# Patient Record
Sex: FEMALE | HISPANIC OR LATINO
[De-identification: names, ages, dates, MRNs, and addresses within clinical notes are randomized per-mention and may not be internally consistent; named-entity substitution may affect disease eponyms.]

---

## 2017-01-04 ENCOUNTER — HOSPITAL (OUTPATIENT)
Dept: OTHER | Age: 72
End: 2017-01-04
Attending: INTERNAL MEDICINE

## 2017-01-04 LAB
A/G RATIO_: 0.7
ABS LYMPH: 1.1 K/CUMM (ref 1–3.5)
ABS MONO: 0.7 K/CUMM (ref 0.1–0.8)
ABS NEUTRO: 1.8 K/CUMM (ref 2–8)
ALBUMIN: 2.6 G/DL (ref 3.5–5)
ALK PHOS: 65 UNIT/L (ref 50–124)
ALT/GPT: 12 UNIT/L (ref 0–55)
ANION GAP SERPL CALC-SCNC: 14 MEQ/L (ref 10–20)
AST/GOT: 18 UNIT/L (ref 5–34)
BASOPHIL: 0 % (ref 0–1)
BILI TOTAL: 0.8 MG/DL (ref 0.2–1)
BNP: 452 PG/ML (ref 1–100)
BUN SERPL-MCNC: 18 MG/DL (ref 6–20)
CALCIUM: 8.5 MG/DL (ref 8.4–10.2)
CHLORIDE: 102 MEQ/L (ref 97–107)
CREATININE: 1.05 MG/DL (ref 0.6–1.3)
DIFF_TYPE?: ABNORMAL
EOSINOPHIL: 2 % (ref 0–6)
GLOBULIN_: 3.7 G/DL (ref 2–4.1)
GLUCOSE LVL: 244 MG/DL (ref 70–99)
HCT VFR BLD CALC: 28 % (ref 33–45)
HEMOLYSIS 2+: NEGATIVE
HEMOLYSIS 4+: NEGATIVE
HGB BLD-MCNC: 9.1 G/DL (ref 11–15)
IMMATURE GRAN: 1.8 % (ref 0–0.3)
INSTR WBC: 3.8 K/CUMM (ref 4–11)
LIPEMIC 3+: NEGATIVE
LYMPHOCYTE: 29 %
MCH RBC QN AUTO: 33 PG (ref 25–35)
MCHC RBC AUTO-ENTMCNC: 32 G/DL (ref 32–37)
MCV RBC AUTO: 102 FL (ref 78–97)
MONOCYTE: 19 %
NEUTROPHIL: 48 %
NRBC BLD MANUAL-RTO: 0.5 % (ref 0–0.2)
PLATELET: 259 K/CUMM (ref 150–450)
PLT ESTIMATE: ADEQUATE
POTASSIUM: 3.5 MEQ/L (ref 3.5–5.1)
RBC # BLD: 2.79 M/CUMM (ref 3.7–5.2)
RBC MORPH2: ABNORMAL
RBC MORPH3: ABNORMAL
RBC MORPH4: ABNORMAL
RBC MORPH: ABNORMAL
RDW: 20 % (ref 11.5–14.5)
SODIUM: 136 MEQ/L (ref 136–145)
TCO2: 24 MEQ/L (ref 19–29)
TOTAL PROTEIN: 6.3 G/DL (ref 6.4–8.3)
WBC # BLD: 3.8 K/CUMM (ref 4–11)

## 2017-01-06 ENCOUNTER — HOSPITAL (OUTPATIENT)
Dept: OTHER | Age: 72
End: 2017-01-06
Attending: FAMILY MEDICINE

## 2017-01-06 LAB
A/G RATIO_: 0.7
ABS LYMPH: 1.1 K/CUMM (ref 1–3.5)
ABS MONO: 0.8 K/CUMM (ref 0.1–0.8)
ABS NEUTRO: 4.8 K/CUMM (ref 2–8)
ALBUMIN: 3 G/DL (ref 3.5–5)
ALK PHOS: 73 UNIT/L (ref 50–124)
ALT/GPT: 11 UNIT/L (ref 0–55)
ANION GAP SERPL CALC-SCNC: 12 MEQ/L (ref 10–20)
APTT PPP: 24 SECONDS (ref 22–30)
AST/GOT: 23 UNIT/L (ref 5–34)
BASOPHIL: 1 % (ref 0–1)
BILI TOTAL: 1 MG/DL (ref 0.2–1)
BUN SERPL-MCNC: 19 MG/DL (ref 6–20)
CALCIUM: 9.1 MG/DL (ref 8.4–10.2)
CHLORIDE: 102 MEQ/L (ref 97–107)
CREATININE: 0.93 MG/DL (ref 0.6–1.3)
DEVICE SN: ABNORMAL
DIFF_TYPE?: ABNORMAL
EOSINOPHIL: 1 % (ref 0–6)
GLOBULIN_: 4.2 G/DL (ref 2–4.1)
GLUCOSE LVL: 280 MG/DL (ref 70–99)
HCT VFR BLD CALC: 32 % (ref 33–45)
HEMOLYSIS 2+: NEGATIVE
HEMOLYSIS 4+: NEGATIVE
HGB BLD-MCNC: 9.8 G/DL (ref 11–15)
ICTERIC 4+: NEGATIVE
IMMATURE GRAN: 2.7 % (ref 0–0.3)
INR: 1.1 (ref 0.9–1.1)
INSTR WBC: 7 K/CUMM (ref 4–11)
KETONES: NEGATIVE
LIPEMIC 3+: NEGATIVE
LYMPHOCYTE: 15 %
MCH RBC QN AUTO: 32 PG (ref 25–35)
MCHC RBC AUTO-ENTMCNC: 31 G/DL (ref 32–37)
MCV RBC AUTO: 102 FL (ref 78–97)
MONOCYTE: 12 %
NEUTROPHIL: 69 %
NRBC BLD MANUAL-RTO: 0 % (ref 0–0.2)
PLATELET: 275 K/CUMM (ref 150–450)
POC_GLU: 226 MG/DL (ref 70–99)
POTASSIUM: 3.7 MEQ/L (ref 3.5–5.1)
PROTHROMBIN TIME: 11.6 SECONDS (ref 9.7–11.6)
RBC # BLD: 3.09 M/CUMM (ref 3.7–5.2)
RDW: 19.9 % (ref 11.5–14.5)
SODIUM: 138 MEQ/L (ref 136–145)
TCO2: 28 MEQ/L (ref 19–29)
TECH_ID: ABNORMAL
TOTAL CK: 331 U/L (ref 29–168)
TOTAL PROTEIN: 7.2 G/DL (ref 6.4–8.3)
TROPONIN I: 0.05 NG/ML
UA APPEAR: CLEAR
UA BACTERIA: ABNORMAL
UA BILI: NEGATIVE
UA BLOOD: ABNORMAL
UA COLOR: YELLOW
UA CRYSTAL: ABNORMAL
UA EPITHELIAL: ABNORMAL
UA GLUCOSE: ABNORMAL
UA KETONES: NEGATIVE
UA LEUK EST: ABNORMAL
UA MUCOUS: ABNORMAL
UA NITRITE: NEGATIVE
UA PH: 5 (ref 5–7)
UA PROTEIN: NEGATIVE
UA RBC: ABNORMAL
UA SPEC GRAV: 1.02 (ref 1.01–1.02)
UA UROBILINOGEN: 0.2 MG/DL (ref 0.2–1)
UA WBC: ABNORMAL
WBC # BLD: 7 K/CUMM (ref 4–11)

## 2017-01-07 LAB
ABS LYMPH: 1.1 K/CUMM (ref 1–3.5)
ABS MONO: 0.9 K/CUMM (ref 0.1–0.8)
ABS NEUTRO: 3.3 K/CUMM (ref 2–8)
ANION GAP SERPL CALC-SCNC: 11 MEQ/L (ref 10–20)
BASOPHIL: 0 % (ref 0–1)
BUN SERPL-MCNC: 15 MG/DL (ref 6–20)
CALCIUM: 8.1 MG/DL (ref 8.4–10.2)
CHLORIDE: 106 MEQ/L (ref 97–107)
CREATININE: 0.79 MG/DL (ref 0.6–1.3)
DEVICE SN: ABNORMAL
DIFF_TYPE?: ABNORMAL
EOSINOPHIL: 2 % (ref 0–6)
GLUCOSE LVL: 195 MG/DL (ref 70–99)
HCT VFR BLD CALC: 27 % (ref 33–45)
HEMOLYSIS 2+: NEGATIVE
HGB BLD-MCNC: 8.4 G/DL (ref 11–15)
IMMATURE GRAN: 1.8 % (ref 0–0.3)
INSTR WBC: 5.5 K/CUMM (ref 4–11)
LYMPHOCYTE: 20 %
MCH RBC QN AUTO: 32 PG (ref 25–35)
MCHC RBC AUTO-ENTMCNC: 31 G/DL (ref 32–37)
MCV RBC AUTO: 104 FL (ref 78–97)
MONOCYTE: 16 %
NEUTROPHIL: 60 %
NRBC BLD MANUAL-RTO: 0 % (ref 0–0.2)
PLATELET: 226 K/CUMM (ref 150–450)
POC_GLU: 130 MG/DL (ref 70–99)
POC_GLU: 188 MG/DL (ref 70–99)
POC_GLU: 204 MG/DL (ref 70–99)
POC_GLU: 204 MG/DL (ref 70–99)
POTASSIUM: 3.6 MEQ/L (ref 3.5–5.1)
RBC # BLD: 2.6 M/CUMM (ref 3.7–5.2)
RDW: 19.9 % (ref 11.5–14.5)
SODIUM: 136 MEQ/L (ref 136–145)
TCO2: 23 MEQ/L (ref 19–29)
TECH_ID: ABNORMAL
WBC # BLD: 5.5 K/CUMM (ref 4–11)

## 2017-01-08 LAB
ABS LYMPH: 1.2 K/CUMM (ref 1–3.5)
ABS MONO: 1 K/CUMM (ref 0.1–0.8)
ABS NEUTRO: 4 K/CUMM (ref 2–8)
ANION GAP SERPL CALC-SCNC: 10 MEQ/L (ref 10–20)
BASOPHIL: 0 % (ref 0–1)
BUN SERPL-MCNC: 18 MG/DL (ref 6–20)
CALCIUM: 8.2 MG/DL (ref 8.4–10.2)
CHLORIDE: 106 MEQ/L (ref 97–107)
CREATININE: 0.75 MG/DL (ref 0.6–1.3)
DEVICE SN: ABNORMAL
DIFF_TYPE?: ABNORMAL
EOSINOPHIL: 2 % (ref 0–6)
GLUCOSE LVL: 146 MG/DL (ref 70–99)
HCT VFR BLD CALC: 28 % (ref 33–45)
HEMOLYSIS 2+: NEGATIVE
HEMOLYSIS 2+: NEGATIVE
HGB BLD-MCNC: 8.6 G/DL (ref 11–15)
IMMATURE GRAN: 1.5 % (ref 0–0.3)
INSTR WBC: 6.5 K/CUMM (ref 4–11)
LYMPHOCYTE: 18 %
MAGNESIUM LEVEL: 1.7 MG/DL (ref 1.6–2.6)
MCH RBC QN AUTO: 32 PG (ref 25–35)
MCHC RBC AUTO-ENTMCNC: 31 G/DL (ref 32–37)
MCV RBC AUTO: 105 FL (ref 78–97)
MONOCYTE: 16 %
NEUTROPHIL: 62 %
NRBC BLD MANUAL-RTO: 0 % (ref 0–0.2)
PLATELET: 221 K/CUMM (ref 150–450)
POC_GLU: 137 MG/DL (ref 70–99)
POC_GLU: 144 MG/DL (ref 70–99)
POC_GLU: 155 MG/DL (ref 70–99)
POC_GLU: 176 MG/DL (ref 70–99)
POTASSIUM: 4.2 MEQ/L (ref 3.5–5.1)
RBC # BLD: 2.67 M/CUMM (ref 3.7–5.2)
RDW: 19.5 % (ref 11.5–14.5)
SODIUM: 137 MEQ/L (ref 136–145)
TCO2: 25 MEQ/L (ref 19–29)
TECH_ID: ABNORMAL
WBC # BLD: 6.5 K/CUMM (ref 4–11)

## 2017-01-09 LAB
ABS LYMPH: 1.1 K/CUMM (ref 1–3.5)
ABS MONO: 0.9 K/CUMM (ref 0.1–0.8)
ABS NEUTRO: 3.8 K/CUMM (ref 2–8)
BASOPHIL: 0 % (ref 0–1)
DEVICE SN: ABNORMAL
DIFF_TYPE?: ABNORMAL
EOSINOPHIL: 3 % (ref 0–6)
HCT VFR BLD CALC: 28 % (ref 33–45)
HGB BLD-MCNC: 8.6 G/DL (ref 11–15)
IMMATURE GRAN: 2.3 % (ref 0–0.3)
INSTR WBC: 6.1 K/CUMM (ref 4–11)
LYMPHOCYTE: 17 %
MCH RBC QN AUTO: 32 PG (ref 25–35)
MCHC RBC AUTO-ENTMCNC: 31 G/DL (ref 32–37)
MCV RBC AUTO: 103 FL (ref 78–97)
MONOCYTE: 15 %
NEUTROPHIL: 62 %
NRBC BLD MANUAL-RTO: 0 % (ref 0–0.2)
PLATELET: 239 K/CUMM (ref 150–450)
POC_GLU: 137 MG/DL (ref 70–99)
POC_GLU: 162 MG/DL (ref 70–99)
POC_GLU: 183 MG/DL (ref 70–99)
POC_GLU: 227 MG/DL (ref 70–99)
RBC # BLD: 2.66 M/CUMM (ref 3.7–5.2)
RDW: 18.8 % (ref 11.5–14.5)
TECH_ID: ABNORMAL
WBC # BLD: 6.1 K/CUMM (ref 4–11)

## 2017-01-10 LAB
DEVICE SN: ABNORMAL
POC_GLU: 126 MG/DL (ref 70–99)
POC_GLU: 172 MG/DL (ref 70–99)
POC_GLU: 181 MG/DL (ref 70–99)
POC_GLU: 194 MG/DL (ref 70–99)
TECH_ID: ABNORMAL

## 2017-01-11 LAB
ABS LYMPH: 0.9 K/CUMM (ref 1–3.5)
ABS MONO: 0.8 K/CUMM (ref 0.1–0.8)
ABS NEUTRO: 4.2 K/CUMM (ref 2–8)
BASOPHIL: 1 % (ref 0–1)
DEVICE SN: ABNORMAL
DEVICE SN: ABNORMAL
DIFF_TYPE?: ABNORMAL
EOSINOPHIL: 2 % (ref 0–6)
HCT VFR BLD CALC: 29 % (ref 33–45)
HGB BLD-MCNC: 8.9 G/DL (ref 11–15)
IMMATURE GRAN: 1.7 % (ref 0–0.3)
INSTR WBC: 6.3 K/CUMM (ref 4–11)
LYMPHOCYTE: 15 %
MCH RBC QN AUTO: 32 PG (ref 25–35)
MCHC RBC AUTO-ENTMCNC: 31 G/DL (ref 32–37)
MCV RBC AUTO: 103 FL (ref 78–97)
MONOCYTE: 13 %
NEUTROPHIL: 67 %
NRBC BLD MANUAL-RTO: 0 % (ref 0–0.2)
PLATELET: 260 K/CUMM (ref 150–450)
POC_GLU: 143 MG/DL (ref 70–99)
POC_GLU: 187 MG/DL (ref 70–99)
RBC # BLD: 2.82 M/CUMM (ref 3.7–5.2)
RDW: 18 % (ref 11.5–14.5)
TECH_ID: ABNORMAL
TECH_ID: ABNORMAL
WBC # BLD: 6.3 K/CUMM (ref 4–11)

## 2017-01-12 ENCOUNTER — HOSPITAL (OUTPATIENT)
Dept: OTHER | Age: 72
End: 2017-01-12
Attending: INTERNAL MEDICINE

## 2017-01-12 ENCOUNTER — HOSPITAL (OUTPATIENT)
Dept: OTHER | Age: 72
End: 2017-01-12

## 2017-01-12 LAB
A/G RATIO_: 0.6
A/G RATIO_: 0.6
ABS LYMPH: 0.9 K/CUMM (ref 1–3.5)
ABS LYMPH: 1.3 K/CUMM (ref 1–3.5)
ABS MONO: 0.9 K/CUMM (ref 0.1–0.8)
ABS MONO: 1.1 K/CUMM (ref 0.1–0.8)
ABS NEUTRO: 4.1 K/CUMM (ref 2–8)
ABS NEUTRO: 4.2 K/CUMM (ref 2–8)
ALBUMIN: 2.3 G/DL (ref 3.5–5)
ALBUMIN: 2.4 G/DL (ref 3.5–5)
ALK PHOS: 67 UNIT/L (ref 50–124)
ALK PHOS: 68 UNIT/L (ref 50–124)
ALT/GPT: <6 UNIT/L (ref 0–55)
ALT/GPT: <6 UNIT/L (ref 0–55)
ANION GAP SERPL CALC-SCNC: 12 MEQ/L (ref 10–20)
ANION GAP SERPL CALC-SCNC: 14 MEQ/L (ref 10–20)
AST/GOT: 17 UNIT/L (ref 5–34)
AST/GOT: 18 UNIT/L (ref 5–34)
BASOPHIL: 0 % (ref 0–1)
BASOPHIL: 0 % (ref 0–1)
BILI TOTAL: 0.6 MG/DL (ref 0.2–1)
BILI TOTAL: 0.7 MG/DL (ref 0.2–1)
BUN SERPL-MCNC: 14 MG/DL (ref 6–20)
BUN SERPL-MCNC: 20 MG/DL (ref 6–20)
CALCIUM: 8.5 MG/DL (ref 8.4–10.2)
CALCIUM: 8.6 MG/DL (ref 8.4–10.2)
CHLORIDE: 100 MEQ/L (ref 97–107)
CHLORIDE: 102 MEQ/L (ref 97–107)
CREATININE: 0.71 MG/DL (ref 0.6–1.3)
CREATININE: 0.78 MG/DL (ref 0.6–1.3)
DIFF_TYPE?: ABNORMAL
DIFF_TYPE?: ABNORMAL
EOSINOPHIL: 2 % (ref 0–6)
EOSINOPHIL: 3 % (ref 0–6)
GLOBULIN_: 3.6 G/DL (ref 2–4.1)
GLOBULIN_: 3.7 G/DL (ref 2–4.1)
GLUCOSE LVL: 142 MG/DL (ref 70–99)
GLUCOSE LVL: 202 MG/DL (ref 70–99)
HCT VFR BLD CALC: 28 % (ref 33–45)
HCT VFR BLD CALC: 29 % (ref 33–45)
HEMOLYSIS 1+: NEGATIVE
HEMOLYSIS 2+: NEGATIVE
HGB BLD-MCNC: 8.7 G/DL (ref 11–15)
HGB BLD-MCNC: 9.1 G/DL (ref 11–15)
IMMATURE GRAN: 1.7 % (ref 0–0.3)
IMMATURE GRAN: 1.8 % (ref 0–0.3)
INSTR WBC: 6.2 K/CUMM (ref 4–11)
INSTR WBC: 6.9 K/CUMM (ref 4–11)
LIPEMIC 3+: NEGATIVE
LIPEMIC 3+: NEGATIVE
LYMPHOCYTE: 15 %
LYMPHOCYTE: 18 %
MAGNESIUM LEVEL: 1.5 MG/DL (ref 1.6–2.6)
MCH RBC QN AUTO: 32 PG (ref 25–35)
MCH RBC QN AUTO: 32 PG (ref 25–35)
MCHC RBC AUTO-ENTMCNC: 31 G/DL (ref 32–37)
MCHC RBC AUTO-ENTMCNC: 31 G/DL (ref 32–37)
MCV RBC AUTO: 103 FL (ref 78–97)
MCV RBC AUTO: 104 FL (ref 78–97)
MONOCYTE: 14 %
MONOCYTE: 16 %
NEUTROPHIL: 61 %
NEUTROPHIL: 67 %
NRBC BLD MANUAL-RTO: 0 % (ref 0–0.2)
NRBC BLD MANUAL-RTO: 0 % (ref 0–0.2)
PLATELET: 259 K/CUMM (ref 150–450)
PLATELET: 269 K/CUMM (ref 150–450)
POTASSIUM: 4.7 MEQ/L (ref 3.5–5.1)
POTASSIUM: 5 MEQ/L (ref 3.5–5.1)
RBC # BLD: 2.71 M/CUMM (ref 3.7–5.2)
RBC # BLD: 2.82 M/CUMM (ref 3.7–5.2)
RDW: 17.9 % (ref 11.5–14.5)
RDW: 18.1 % (ref 11.5–14.5)
SODIUM: 138 MEQ/L (ref 136–145)
SODIUM: 138 MEQ/L (ref 136–145)
TCO2: 29 MEQ/L (ref 19–29)
TCO2: 29 MEQ/L (ref 19–29)
TOTAL PROTEIN: 5.9 G/DL (ref 6.4–8.3)
TOTAL PROTEIN: 6.1 G/DL (ref 6.4–8.3)
TSH SERPL-ACNC: 3.4 MIU/ML (ref 0.4–5)
VIT D 25 OH LVL: <13 NG/ML
WBC # BLD: 6.2 K/CUMM (ref 4–11)
WBC # BLD: 6.9 K/CUMM (ref 4–11)

## 2017-01-13 ENCOUNTER — HOSPITAL (OUTPATIENT)
Dept: OTHER | Age: 72
End: 2017-01-13
Attending: INTERNAL MEDICINE

## 2017-01-13 LAB
A/G RATIO_: 0.7
ABS LYMPH: 1.2 K/CUMM (ref 1–3.5)
ABS MONO: 0.8 K/CUMM (ref 0.1–0.8)
ABS NEUTRO: 4.1 K/CUMM (ref 2–8)
ALBUMIN: 2.5 G/DL (ref 3.5–5)
ALK PHOS: 68 UNIT/L (ref 50–124)
ALT/GPT: <6 UNIT/L (ref 0–55)
ANION GAP SERPL CALC-SCNC: 12 MEQ/L (ref 10–20)
AST/GOT: 16 UNIT/L (ref 5–34)
BASOPHIL: 1 % (ref 0–1)
BILI TOTAL: 0.8 MG/DL (ref 0.2–1)
BUN SERPL-MCNC: 18 MG/DL (ref 6–20)
CALCIUM: 8.6 MG/DL (ref 8.4–10.2)
CHLORIDE: 100 MEQ/L (ref 97–107)
CREATININE: 0.77 MG/DL (ref 0.6–1.3)
DIFF_TYPE?: ABNORMAL
EOSINOPHIL: 3 % (ref 0–6)
GLOBULIN_: 3.5 G/DL (ref 2–4.1)
GLUCOSE LVL: 203 MG/DL (ref 70–99)
HCT VFR BLD CALC: 30 % (ref 33–45)
HEMOLYSIS 2+: NEGATIVE
HGB BLD-MCNC: 9.1 G/DL (ref 11–15)
IMMATURE GRAN: 1.7 % (ref 0–0.3)
INSTR WBC: 6.5 K/CUMM (ref 4–11)
LIPEMIC 3+: NEGATIVE
LYMPHOCYTE: 18 %
MCH RBC QN AUTO: 32 PG (ref 25–35)
MCHC RBC AUTO-ENTMCNC: 31 G/DL (ref 32–37)
MCV RBC AUTO: 104 FL (ref 78–97)
MONOCYTE: 13 %
NEUTROPHIL: 64 %
NRBC BLD MANUAL-RTO: 0 % (ref 0–0.2)
PLATELET: 268 K/CUMM (ref 150–450)
POTASSIUM: 4.9 MEQ/L (ref 3.5–5.1)
RBC # BLD: 2.83 M/CUMM (ref 3.7–5.2)
RDW: 17.7 % (ref 11.5–14.5)
SODIUM: 136 MEQ/L (ref 136–145)
TCO2: 29 MEQ/L (ref 19–29)
TOTAL PROTEIN: 6 G/DL (ref 6.4–8.3)
WBC # BLD: 6.5 K/CUMM (ref 4–11)

## 2017-01-16 ENCOUNTER — HOSPITAL (OUTPATIENT)
Dept: OTHER | Age: 72
End: 2017-01-16
Attending: INTERNAL MEDICINE

## 2017-01-16 LAB
ABS LYMPH: 1 K/CUMM (ref 1–3.5)
ABS MONO: 0.8 K/CUMM (ref 0.1–0.8)
ABS NEUTRO: 3.6 K/CUMM (ref 2–8)
BASOPHIL: 1 % (ref 0–1)
DIFF_TYPE?: ABNORMAL
EOSINOPHIL: 6 % (ref 0–6)
HCT VFR BLD CALC: 29 % (ref 33–45)
HGB BLD-MCNC: 8.7 G/DL (ref 11–15)
IMMATURE GRAN: 1.8 % (ref 0–0.3)
INSTR WBC: 6 K/CUMM (ref 4–11)
LYMPHOCYTE: 18 %
MCH RBC QN AUTO: 32 PG (ref 25–35)
MCHC RBC AUTO-ENTMCNC: 30 G/DL (ref 32–37)
MCV RBC AUTO: 106 FL (ref 78–97)
MONOCYTE: 14 %
NEUTROPHIL: 60 %
NRBC BLD MANUAL-RTO: 0 % (ref 0–0.2)
PLATELET: 267 K/CUMM (ref 150–450)
RBC # BLD: 2.74 M/CUMM (ref 3.7–5.2)
RDW: 17.4 % (ref 11.5–14.5)
WBC # BLD: 6 K/CUMM (ref 4–11)

## 2017-01-18 LAB
A/G RATIO_: 0.6
ABS LYMPH: 1 K/CUMM (ref 1–3.5)
ABS MONO: 0.6 K/CUMM (ref 0.1–0.8)
ABS NEUTRO: 3.6 K/CUMM (ref 2–8)
ALBUMIN: 2.4 G/DL (ref 3.5–5)
ALK PHOS: 72 UNIT/L (ref 50–124)
ALT/GPT: 8 UNIT/L (ref 0–55)
ANION GAP SERPL CALC-SCNC: 12 MEQ/L (ref 10–20)
AST/GOT: 18 UNIT/L (ref 5–34)
BASOPHIL: 0 % (ref 0–1)
BILI TOTAL: 0.6 MG/DL (ref 0.2–1)
BUN SERPL-MCNC: 22 MG/DL (ref 6–20)
CALCIUM: 8.9 MG/DL (ref 8.4–10.2)
CHLORIDE: 102 MEQ/L (ref 97–107)
CREATININE: 0.78 MG/DL (ref 0.6–1.3)
DIFF_TYPE?: ABNORMAL
EOSINOPHIL: 5 % (ref 0–6)
GLOBULIN_: 4 G/DL (ref 2–4.1)
GLUCOSE LVL: 170 MG/DL (ref 70–99)
HCT VFR BLD CALC: 30 % (ref 33–45)
HEMOLYSIS 2+: NEGATIVE
HGB BLD-MCNC: 9.2 G/DL (ref 11–15)
IMMATURE GRAN: 1.4 % (ref 0–0.3)
INSTR WBC: 5.6 K/CUMM (ref 4–11)
LIPEMIC 3+: NEGATIVE
LYMPHOCYTE: 18 %
MCH RBC QN AUTO: 32 PG (ref 25–35)
MCHC RBC AUTO-ENTMCNC: 31 G/DL (ref 32–37)
MCV RBC AUTO: 104 FL (ref 78–97)
MONOCYTE: 10 %
NEUTROPHIL: 65 %
NRBC BLD MANUAL-RTO: 0 % (ref 0–0.2)
PLATELET: 291 K/CUMM (ref 150–450)
POTASSIUM: 3.8 MEQ/L (ref 3.5–5.1)
RBC # BLD: 2.87 M/CUMM (ref 3.7–5.2)
RDW: 16.9 % (ref 11.5–14.5)
SODIUM: 137 MEQ/L (ref 136–145)
TCO2: 27 MEQ/L (ref 19–29)
TOTAL PROTEIN: 6.4 G/DL (ref 6.4–8.3)
WBC # BLD: 5.6 K/CUMM (ref 4–11)

## 2017-01-19 ENCOUNTER — HOSPITAL (OUTPATIENT)
Dept: OTHER | Age: 72
End: 2017-01-19
Attending: INTERNAL MEDICINE

## 2017-01-26 ENCOUNTER — HOSPITAL (OUTPATIENT)
Dept: OTHER | Age: 72
End: 2017-01-26
Attending: INTERNAL MEDICINE

## 2017-01-26 LAB
ABS LYMPH MAN: 1.2 K/CUMM (ref 1–3.5)
ABS MONO MAN: 0.5 K/CUMM (ref 0.1–0.8)
ABS NEUT MAN: 1.9 K/CUMM (ref 1.8–7.8)
BAND MAN: 2 % (ref 2–8)
BASOPHIL MAN: 0 % (ref 0–1)
DIFF_TYPE?: ABNORMAL
EOS MAN: 2 % (ref 0–4)
HCT VFR BLD CALC: 34 % (ref 33–45)
HGB BLD-MCNC: 10.3 G/DL (ref 11–15)
INSTR WBC: 3.7 K/CUMM (ref 4–11)
LYMPH MAN: 27 %
MCH RBC QN AUTO: 32 PG (ref 25–35)
MCHC RBC AUTO-ENTMCNC: 30 G/DL (ref 32–37)
MCV RBC AUTO: 105 FL (ref 78–97)
MONOCYTE MAN: 13 %
NRBC BLD MANUAL-RTO: 0 % (ref 0–0.2)
PLATELET MORPH: ABNORMAL
PLATELET: 169 K/CUMM (ref 150–450)
PLT ESTIMATE: ADEQUATE
RBC # BLD: 3.27 M/CUMM (ref 3.7–5.2)
RBC MORPH2: ABNORMAL
RBC MORPH: ABNORMAL
RDW: 17 % (ref 11.5–14.5)
REACT LYMPH MAN: 6 %
SEGS MAN: 50 %
TOTAL LYMPHS MANUAL: 33 %
WBC # BLD: 3.7 K/CUMM (ref 4–11)

## 2017-02-01 ENCOUNTER — HOSPITAL (OUTPATIENT)
Dept: OTHER | Age: 72
End: 2017-02-01
Attending: INTERNAL MEDICINE

## 2017-02-01 LAB
ABS NEUTRO: 4.1 K/CUMM (ref 2–8)
HCT VFR BLD CALC: 33 % (ref 33–45)
HGB BLD-MCNC: 10.2 G/DL (ref 11–15)
INSTR WBC: 6.5 K/CUMM (ref 4–11)
MCH RBC QN AUTO: 32 PG (ref 25–35)
MCHC RBC AUTO-ENTMCNC: 30 G/DL (ref 32–37)
MCV RBC AUTO: 105 FL (ref 78–97)
NRBC BLD MANUAL-RTO: 0 % (ref 0–0.2)
PLATELET: 235 K/CUMM (ref 150–450)
RBC # BLD: 3.18 M/CUMM (ref 3.7–5.2)
RDW: 15.5 % (ref 11.5–14.5)
WBC # BLD: 6.5 K/CUMM (ref 4–11)

## 2017-02-03 ENCOUNTER — HOSPITAL (OUTPATIENT)
Dept: OTHER | Age: 72
End: 2017-02-03
Attending: INTERNAL MEDICINE

## 2017-02-03 LAB
FOLATE: 13 NG/ML (ref 7–31)
HEMOLYSIS 1+: NORMAL
HEMOLYSIS 1+: NORMAL
VITAMIN B12 LVL: 361 PG/ML (ref 213–816)

## 2017-02-14 ENCOUNTER — HOSPITAL (OUTPATIENT)
Dept: OTHER | Age: 72
End: 2017-02-14
Attending: INTERNAL MEDICINE

## 2017-03-05 ENCOUNTER — HOSPITAL (OUTPATIENT)
Dept: OTHER | Age: 72
End: 2017-03-05
Attending: EMERGENCY MEDICINE

## 2017-03-08 ENCOUNTER — HOSPITAL (OUTPATIENT)
Dept: OTHER | Age: 72
End: 2017-03-08
Attending: INTERNAL MEDICINE

## 2017-03-21 ENCOUNTER — HOSPITAL (OUTPATIENT)
Dept: OTHER | Age: 72
End: 2017-03-21
Attending: PSYCHIATRY & NEUROLOGY

## 2017-04-12 ENCOUNTER — HOSPITAL (OUTPATIENT)
Dept: OTHER | Age: 72
End: 2017-04-12
Attending: INTERNAL MEDICINE

## 2017-04-12 LAB
A/G RATIO_: 0.6
ALBUMIN: 2.5 G/DL (ref 3.5–5)
ALK PHOS: 84 UNIT/L (ref 50–124)
ALT/GPT: 11 UNIT/L (ref 0–55)
ANION GAP SERPL CALC-SCNC: 13 MEQ/L (ref 10–20)
AST/GOT: 19 UNIT/L (ref 5–34)
BILI TOTAL: 0.8 MG/DL (ref 0.2–1)
BUN SERPL-MCNC: 18 MG/DL (ref 6–20)
CALCIUM: 9 MG/DL (ref 8.4–10.2)
CHLORIDE: 101 MEQ/L (ref 97–107)
CREATININE: 0.81 MG/DL (ref 0.6–1.3)
GLOBULIN_: 4.1 G/DL (ref 2–4.1)
GLUCOSE LVL: 138 MG/DL (ref 70–99)
HEMOLYSIS 2+: NEGATIVE
LIPEMIC 3+: NEGATIVE
POTASSIUM: 3.6 MEQ/L (ref 3.5–5.1)
SODIUM: 138 MEQ/L (ref 136–145)
TCO2: 28 MEQ/L (ref 19–29)
TOTAL PROTEIN: 6.6 G/DL (ref 6.4–8.3)

## 2017-04-19 ENCOUNTER — HOSPITAL (OUTPATIENT)
Dept: OTHER | Age: 72
End: 2017-04-19
Attending: INTERNAL MEDICINE

## 2017-06-01 ENCOUNTER — HOSPITAL (OUTPATIENT)
Dept: OTHER | Age: 72
End: 2017-06-01
Attending: FAMILY MEDICINE

## 2017-06-01 LAB
A/G RATIO_: 0.7
ABBOTT CA-125: 116 U/ML
ABS LYMPH: 0.8 K/CUMM (ref 1–3.5)
ABS MONO: 0.3 K/CUMM (ref 0.1–0.8)
ABS NEUTRO: 6 K/CUMM (ref 2–8)
ALBUMIN: 3 G/DL (ref 3.5–5)
ALK PHOS: 89 UNIT/L (ref 50–124)
ALT/GPT: 13 UNIT/L (ref 0–55)
ANION GAP SERPL CALC-SCNC: 16 MEQ/L (ref 10–20)
AST/GOT: 22 UNIT/L (ref 5–34)
BASOPHIL: 0 % (ref 0–1)
BILI TOTAL: 2.3 MG/DL (ref 0.2–1)
BUN SERPL-MCNC: 16 MG/DL (ref 6–20)
CALCIUM: 9.1 MG/DL (ref 8.4–10.2)
CHLORIDE: 99 MEQ/L (ref 97–107)
CREATININE: 0.76 MG/DL (ref 0.6–1.3)
DEVICE SN: ABNORMAL
DIFF_TYPE?: ABNORMAL
EOSINOPHIL: 0 % (ref 0–6)
GLOBULIN_: 4.3 G/DL (ref 2–4.1)
GLUCOSE LVL: 215 MG/DL (ref 70–99)
HCT VFR BLD CALC: 43 % (ref 33–45)
HEMOLYSIS 2+: NEGATIVE
HEMOLYSIS 4+: NEGATIVE
HGB BLD-MCNC: 13.6 G/DL (ref 11–15)
ICTERIC 4+: NORMAL
IMMATURE GRAN: 0.8 % (ref 0–0.3)
INSTR WBC: 7.2 K/CUMM (ref 4–11)
LIPASE LEVEL: 9 UNIT/L (ref 8–78)
LIPEMIC 3+: NEGATIVE
LYMPHOCYTE: 12 %
MCH RBC QN AUTO: 31 PG (ref 25–35)
MCHC RBC AUTO-ENTMCNC: 32 G/DL (ref 32–37)
MCV RBC AUTO: 97 FL (ref 78–97)
MONOCYTE: 4 %
NEUTROPHIL: 83 %
NRBC BLD MANUAL-RTO: 0 % (ref 0–0.2)
PLATELET: 175 K/CUMM (ref 150–450)
POC_GLU: 196 MG/DL (ref 70–99)
POTASSIUM: 3.7 MEQ/L (ref 3.5–5.1)
RBC # BLD: 4.44 M/CUMM (ref 3.7–5.2)
RDW: 15.9 % (ref 11.5–14.5)
SODIUM: 138 MEQ/L (ref 136–145)
TCO2: 27 MEQ/L (ref 19–29)
TECH_ID: ABNORMAL
TOTAL PROTEIN: 7.3 G/DL (ref 6.4–8.3)
WBC # BLD: 7.2 K/CUMM (ref 4–11)

## 2017-06-02 LAB
A/G RATIO_: 0.7
ABS LYMPH: 0.9 K/CUMM (ref 1–3.5)
ABS MONO: 0.5 K/CUMM (ref 0.1–0.8)
ABS NEUTRO: 3.9 K/CUMM (ref 2–8)
ALBUMIN: 2.4 G/DL (ref 3.5–5)
ALK PHOS: 67 UNIT/L (ref 50–124)
ALT/GPT: 10 UNIT/L (ref 0–55)
ANION GAP SERPL CALC-SCNC: 10 MEQ/L (ref 10–20)
AST/GOT: 20 UNIT/L (ref 5–34)
BASOPHIL: 0 % (ref 0–1)
BILI TOTAL: 1.4 MG/DL (ref 0.2–1)
BUN SERPL-MCNC: 21 MG/DL (ref 6–20)
CALCIUM: 8.4 MG/DL (ref 8.4–10.2)
CHLORIDE: 101 MEQ/L (ref 97–107)
CREATININE: 0.8 MG/DL (ref 0.6–1.3)
DEVICE SN: ABNORMAL
DEVICE SN: ABNORMAL
DIFF_TYPE?: ABNORMAL
EOSINOPHIL: 0 % (ref 0–6)
GLOBULIN_: 3.4 G/DL (ref 2–4.1)
GLUCOSE LVL: 134 MG/DL (ref 70–99)
HCT VFR BLD CALC: 36 % (ref 33–45)
HEMOLYSIS 2+: NEGATIVE
HGB BLD-MCNC: 11.8 G/DL (ref 11–15)
IMMATURE GRAN: 0.6 % (ref 0–0.3)
INSTR WBC: 5.4 K/CUMM (ref 4–11)
LIPEMIC 3+: NEGATIVE
LYMPHOCYTE: 16 %
MCH RBC QN AUTO: 31 PG (ref 25–35)
MCHC RBC AUTO-ENTMCNC: 32 G/DL (ref 32–37)
MCV RBC AUTO: 97 FL (ref 78–97)
MONOCYTE: 10 %
NEUTROPHIL: 73 %
NRBC BLD MANUAL-RTO: 0 % (ref 0–0.2)
PLATELET: 146 K/CUMM (ref 150–450)
POC_GLU: 108 MG/DL (ref 70–99)
POC_GLU: 119 MG/DL (ref 70–99)
POTASSIUM: 3.6 MEQ/L (ref 3.5–5.1)
RBC # BLD: 3.76 M/CUMM (ref 3.7–5.2)
RDW: 15.9 % (ref 11.5–14.5)
SODIUM: 138 MEQ/L (ref 136–145)
TCO2: 31 MEQ/L (ref 19–29)
TECH_ID: ABNORMAL
TECH_ID: ABNORMAL
TOTAL PROTEIN: 5.8 G/DL (ref 6.4–8.3)
WBC # BLD: 5.4 K/CUMM (ref 4–11)

## 2017-06-03 LAB
DEVICE SN: ABNORMAL
DEVICE SN: ABNORMAL
POC_GLU: 124 MG/DL (ref 70–99)
POC_GLU: 63 MG/DL (ref 70–99)
TECH_ID: ABNORMAL
TECH_ID: ABNORMAL

## 2017-06-07 ENCOUNTER — HOSPITAL (OUTPATIENT)
Dept: OTHER | Age: 72
End: 2017-06-07
Attending: INTERNAL MEDICINE

## 2017-06-07 LAB
ABBOTT CA-125: 135 U/ML
ABBOTT CEA: 0.7 NG/ML

## 2017-06-10 ENCOUNTER — HOSPITAL (OUTPATIENT)
Dept: OTHER | Age: 72
End: 2017-06-10
Attending: FAMILY MEDICINE

## 2017-06-10 LAB
A/G RATIO_: 0.7
ABS LYMPH: 1.1 K/CUMM (ref 1–3.5)
ABS MONO: 1 K/CUMM (ref 0.1–0.8)
ABS NEUTRO: 6.2 K/CUMM (ref 2–8)
ALBUMIN: 2.8 G/DL (ref 3.5–5)
ALK PHOS: 71 UNIT/L (ref 50–124)
ALT/GPT: 8 UNIT/L (ref 0–55)
ANION GAP SERPL CALC-SCNC: 15 MEQ/L (ref 10–20)
APTT PPP: 91 SECONDS (ref 22–30)
AST/GOT: 20 UNIT/L (ref 5–34)
BASOPHIL: 0 % (ref 0–1)
BILI TOTAL: 1.7 MG/DL (ref 0.2–1)
BUN SERPL-MCNC: 10 MG/DL (ref 6–20)
CALCIUM: 8.7 MG/DL (ref 8.4–10.2)
CHLORIDE: 97 MEQ/L (ref 97–107)
CREATININE: 0.72 MG/DL (ref 0.6–1.3)
DIFF_TYPE?: ABNORMAL
EOSINOPHIL: 2 % (ref 0–6)
GLOBULIN_: 3.9 G/DL (ref 2–4.1)
GLUCOSE LVL: 195 MG/DL (ref 70–99)
HCT VFR BLD CALC: 40 % (ref 33–45)
HEMOLYSIS 2+: NEGATIVE
HGB BLD-MCNC: 13.1 G/DL (ref 11–15)
ICTERIC 4+: NEGATIVE
IMMATURE GRAN: 0.4 % (ref 0–0.3)
INR: 1.5 (ref 0.9–1.1)
INSTR WBC: 8.6 K/CUMM (ref 4–11)
LIPASE LEVEL: 6 UNIT/L (ref 8–78)
LIPEMIC 3+: NEGATIVE
LYMPHOCYTE: 13 %
MCH RBC QN AUTO: 31 PG (ref 25–35)
MCHC RBC AUTO-ENTMCNC: 33 G/DL (ref 32–37)
MCV RBC AUTO: 96 FL (ref 78–97)
MONOCYTE: 12 %
NEUTROPHIL: 72 %
NRBC BLD MANUAL-RTO: 0 % (ref 0–0.2)
PLATELET: 150 K/CUMM (ref 150–450)
POTASSIUM: 3.6 MEQ/L (ref 3.5–5.1)
PROTHROMBIN TIME: 15.7 SECONDS (ref 9.7–11.6)
RBC # BLD: 4.18 M/CUMM (ref 3.7–5.2)
RDW: 15.9 % (ref 11.5–14.5)
SODIUM: 136 MEQ/L (ref 136–145)
TCO2: 28 MEQ/L (ref 19–29)
TOTAL PROTEIN: 6.7 G/DL (ref 6.4–8.3)
UA APPEAR: CLEAR
UA BACTERIA: ABNORMAL
UA BILI: ABNORMAL
UA BLOOD: ABNORMAL
UA CAST: ABNORMAL
UA COLOR: YELLOW
UA CRYSTAL: ABNORMAL
UA EPITHELIAL: ABNORMAL
UA GLUCOSE: NEGATIVE
UA ICTOTEST: NORMAL
UA KETONES: ABNORMAL
UA LEUK EST: ABNORMAL
UA MUCOUS: ABNORMAL
UA NITRITE: NEGATIVE
UA PH: 6 (ref 5–7)
UA PROTEIN: ABNORMAL
UA RBC: ABNORMAL
UA SPEC GRAV: >=1.03 (ref 1.01–1.02)
UA UROBILINOGEN: 0.2 MG/DL (ref 0.2–1)
UA WBC: ABNORMAL
WBC # BLD: 8.6 K/CUMM (ref 4–11)

## 2017-06-11 LAB
DEVICE SN: ABNORMAL
POC_GLU: 153 MG/DL (ref 70–99)
POC_GLU: 155 MG/DL (ref 70–99)
POC_GLU: 180 MG/DL (ref 70–99)
POC_GLU: 180 MG/DL (ref 70–99)
POC_GLU: 196 MG/DL (ref 70–99)
TECH_ID: ABNORMAL

## 2017-06-12 LAB
A/G RATIO_: 0.6
ABS LYMPH: 0.9 K/CUMM (ref 1–3.5)
ABS MONO: 1.2 K/CUMM (ref 0.1–0.8)
ABS NEUTRO: 16.1 K/CUMM (ref 2–8)
ALBUMIN: 2.3 G/DL (ref 3.5–5)
ALK PHOS: 61 UNIT/L (ref 50–124)
ALT/GPT: 7 UNIT/L (ref 0–55)
ANION GAP SERPL CALC-SCNC: 11 MEQ/L (ref 10–20)
AST/GOT: 16 UNIT/L (ref 5–34)
BASOPHIL: 0 % (ref 0–1)
BILI TOTAL: 1.9 MG/DL (ref 0.2–1)
BUN SERPL-MCNC: 16 MG/DL (ref 6–20)
CALCIUM: 8.3 MG/DL (ref 8.4–10.2)
CHLORIDE: 98 MEQ/L (ref 97–107)
CREATININE: 0.82 MG/DL (ref 0.6–1.3)
DEVICE SN: ABNORMAL
DEVICE SN: ABNORMAL
DIFF_TYPE?: ABNORMAL
EOSINOPHIL: 0 % (ref 0–6)
GLOBULIN_: 3.7 G/DL (ref 2–4.1)
GLUCOSE LVL: 125 MG/DL (ref 70–99)
HCT VFR BLD CALC: 38 % (ref 33–45)
HEMOLYSIS 2+: NEGATIVE
HGB BLD-MCNC: 12.4 G/DL (ref 11–15)
IMMATURE GRAN: 0.7 % (ref 0–0.3)
INSTR WBC: 18.4 K/CUMM (ref 4–11)
LIPEMIC 3+: NEGATIVE
LYMPHOCYTE: 5 %
MCH RBC QN AUTO: 32 PG (ref 25–35)
MCHC RBC AUTO-ENTMCNC: 32 G/DL (ref 32–37)
MCV RBC AUTO: 97 FL (ref 78–97)
MONOCYTE: 6 %
NEUTROPHIL: 87 %
NRBC BLD MANUAL-RTO: 0 % (ref 0–0.2)
PLATELET: 140 K/CUMM (ref 150–450)
POC_GLU: 124 MG/DL (ref 70–99)
POC_GLU: 165 MG/DL (ref 70–99)
POTASSIUM: 3.2 MEQ/L (ref 3.5–5.1)
RBC # BLD: 3.94 M/CUMM (ref 3.7–5.2)
RDW: 15.7 % (ref 11.5–14.5)
SODIUM: 139 MEQ/L (ref 136–145)
TCO2: 33 MEQ/L (ref 19–29)
TECH_ID: ABNORMAL
TECH_ID: ABNORMAL
TOTAL PROTEIN: 6 G/DL (ref 6.4–8.3)
WBC # BLD: 18.4 K/CUMM (ref 4–11)

## 2017-06-13 LAB
DEVICE SN: ABNORMAL
POC_GLU: 146 MG/DL (ref 70–99)
POC_GLU: 147 MG/DL (ref 70–99)
POC_GLU: 149 MG/DL (ref 70–99)
POC_GLU: 210 MG/DL (ref 70–99)
POC_GLU: 214 MG/DL (ref 70–99)
SURG SPECIMEN: NORMAL
TECH_ID: ABNORMAL

## 2017-06-14 LAB
A/G RATIO_: 0.6
ABS LYMPH: 0.8 K/CUMM (ref 1–3.5)
ABS MONO: 0.7 K/CUMM (ref 0.1–0.8)
ABS NEUTRO: 7.7 K/CUMM (ref 2–8)
ALBUMIN: 2 G/DL (ref 3.5–5)
ALK PHOS: 63 UNIT/L (ref 50–124)
ALT/GPT: 9 UNIT/L (ref 0–55)
ANION GAP SERPL CALC-SCNC: 11 MEQ/L (ref 10–20)
AST/GOT: 18 UNIT/L (ref 5–34)
BASOPHIL: 0 % (ref 0–1)
BILI TOTAL: 1.7 MG/DL (ref 0.2–1)
BUN SERPL-MCNC: 27 MG/DL (ref 6–20)
CALCIUM: 7.9 MG/DL (ref 8.4–10.2)
CHLORIDE: 99 MEQ/L (ref 97–107)
CREATININE: 1.28 MG/DL (ref 0.6–1.3)
DEVICE SN: ABNORMAL
DIFF_TYPE?: ABNORMAL
EOSINOPHIL: 0 % (ref 0–6)
GLOBULIN_: 3.5 G/DL (ref 2–4.1)
GLUCOSE LVL: 133 MG/DL (ref 70–99)
HCT VFR BLD CALC: 31 % (ref 33–45)
HEMOLYSIS 2+: NEGATIVE
HGB BLD-MCNC: 9.9 G/DL (ref 11–15)
IMMATURE GRAN: 0.9 % (ref 0–0.3)
INSTR WBC: 9.3 K/CUMM (ref 4–11)
LIPEMIC 3+: NEGATIVE
LYMPHOCYTE: 8 %
MCH RBC QN AUTO: 32 PG (ref 25–35)
MCHC RBC AUTO-ENTMCNC: 32 G/DL (ref 32–37)
MCV RBC AUTO: 100 FL (ref 78–97)
MONOCYTE: 8 %
NEUTROPHIL: 83 %
NRBC BLD MANUAL-RTO: 0 % (ref 0–0.2)
PLATELET: 119 K/CUMM (ref 150–450)
POC_GLU: 112 MG/DL (ref 70–99)
POC_GLU: 144 MG/DL (ref 70–99)
POC_GLU: 161 MG/DL (ref 70–99)
POTASSIUM: 3.3 MEQ/L (ref 3.5–5.1)
RBC # BLD: 3.14 M/CUMM (ref 3.7–5.2)
RDW: 15.5 % (ref 11.5–14.5)
SODIUM: 136 MEQ/L (ref 136–145)
TCO2: 29 MEQ/L (ref 19–29)
TECH_ID: ABNORMAL
TOTAL PROTEIN: 5.5 G/DL (ref 6.4–8.3)
WBC # BLD: 9.3 K/CUMM (ref 4–11)

## 2017-06-15 LAB
A/G RATIO_: 0.6
ABS LYMPH: 0.7 K/CUMM (ref 1–3.5)
ABS MONO: 0.5 K/CUMM (ref 0.1–0.8)
ABS NEUTRO: 4.4 K/CUMM (ref 2–8)
ALBUMIN: 2 G/DL (ref 3.5–5)
ALK PHOS: 60 UNIT/L (ref 50–124)
ALT/GPT: 10 UNIT/L (ref 0–55)
ANION GAP SERPL CALC-SCNC: 13 MEQ/L (ref 10–20)
AST/GOT: 17 UNIT/L (ref 5–34)
BASOPHIL: 0 % (ref 0–1)
BILI TOTAL: 1.3 MG/DL (ref 0.2–1)
BUN SERPL-MCNC: 32 MG/DL (ref 6–20)
CALCIUM: 8.2 MG/DL (ref 8.4–10.2)
CHLORIDE: 99 MEQ/L (ref 97–107)
CREATININE: 1.8 MG/DL (ref 0.6–1.3)
DEVICE SN: ABNORMAL
DEVICE SN: ABNORMAL
DIFF_TYPE?: ABNORMAL
EOSINOPHIL: 2 % (ref 0–6)
GLOBULIN_: 3.5 G/DL (ref 2–4.1)
GLUCOSE LVL: 99 MG/DL (ref 70–99)
HCT VFR BLD CALC: 31 % (ref 33–45)
HEMOLYSIS 2+: NEGATIVE
HGB BLD-MCNC: 9.7 G/DL (ref 11–15)
IMMATURE GRAN: 0.7 % (ref 0–0.3)
INSTR WBC: 5.8 K/CUMM (ref 4–11)
LIPEMIC 3+: NEGATIVE
LYMPHOCYTE: 12 %
MCH RBC QN AUTO: 31 PG (ref 25–35)
MCHC RBC AUTO-ENTMCNC: 31 G/DL (ref 32–37)
MCV RBC AUTO: 101 FL (ref 78–97)
MONOCYTE: 9 %
NEUTROPHIL: 76 %
NRBC BLD MANUAL-RTO: 0 % (ref 0–0.2)
PLATELET: 101 K/CUMM (ref 150–450)
POC_GLU: 107 MG/DL (ref 70–99)
POC_GLU: 126 MG/DL (ref 70–99)
POTASSIUM: 3.1 MEQ/L (ref 3.5–5.1)
RBC # BLD: 3.1 M/CUMM (ref 3.7–5.2)
RDW: 15.4 % (ref 11.5–14.5)
SODIUM: 137 MEQ/L (ref 136–145)
TCO2: 28 MEQ/L (ref 19–29)
TECH_ID: ABNORMAL
TECH_ID: ABNORMAL
TOTAL PROTEIN: 5.5 G/DL (ref 6.4–8.3)
WBC # BLD: 5.8 K/CUMM (ref 4–11)

## 2017-06-16 LAB
% SATURATION: 27 % (ref 20–55)
A/G RATIO_: 0.5
ABS LYMPH: 0.7 K/CUMM (ref 1–3.5)
ABS LYMPH: 0.7 K/CUMM (ref 1–3.5)
ABS LYMPH: 0.8 K/CUMM (ref 1–3.5)
ABS MONO: 0.5 K/CUMM (ref 0.1–0.8)
ABS MONO: 0.5 K/CUMM (ref 0.1–0.8)
ABS MONO: 0.6 K/CUMM (ref 0.1–0.8)
ABS NEUTRO: 3.2 K/CUMM (ref 2–8)
ABS NEUTRO: 3.9 K/CUMM (ref 2–8)
ABS NEUTRO: 4.3 K/CUMM (ref 2–8)
ALBUMIN: 1.8 G/DL (ref 3.5–5)
ALK PHOS: 74 UNIT/L (ref 50–124)
ALT/GPT: 9 UNIT/L (ref 0–55)
ANION GAP SERPL CALC-SCNC: 13 MEQ/L (ref 10–20)
APTT PPP: 124 SECONDS (ref 22–30)
APTT PPP: 67 SECONDS (ref 22–30)
AST/GOT: 22 UNIT/L (ref 5–34)
BASOPHIL: 0 % (ref 0–1)
BILI TOTAL: 1 MG/DL (ref 0.2–1)
BUN SERPL-MCNC: 35 MG/DL (ref 6–20)
CALC TIBC: 175 UG/DL (ref 260–445)
CALCIUM: 7.9 MG/DL (ref 8.4–10.2)
CHLORIDE: 98 MEQ/L (ref 97–107)
CREATININE: 2.19 MG/DL (ref 0.6–1.3)
DEVICE SN: ABNORMAL
DEVICE SN: NORMAL
DIFF_TYPE?: ABNORMAL
EOSINOPHIL: 3 % (ref 0–6)
FERRITIN: 249.8 NG/ML (ref 10–204)
GLOBULIN_: 3.6 G/DL (ref 2–4.1)
GLUCOSE LVL: 87 MG/DL (ref 70–99)
HCT VFR BLD CALC: 29 % (ref 33–45)
HCT VFR BLD CALC: 30 % (ref 33–45)
HCT VFR BLD CALC: 32 % (ref 33–45)
HEMOLYSIS 2+: NEGATIVE
HEMOLYSIS 2+: NEGATIVE
HEMOLYSIS 3+: NEGATIVE
HGB BLD-MCNC: 10.2 G/DL (ref 11–15)
HGB BLD-MCNC: 9.4 G/DL (ref 11–15)
HGB BLD-MCNC: 9.5 G/DL (ref 11–15)
IMMATURE GRAN: 0.9 % (ref 0–0.3)
IMMATURE GRAN: 0.9 % (ref 0–0.3)
IMMATURE GRAN: 1.2 % (ref 0–0.3)
INR: 1.3 (ref 0.9–1.1)
INR: 1.4 (ref 0.9–1.1)
INSTR WBC: 4.6 K/CUMM (ref 4–11)
INSTR WBC: 5.3 K/CUMM (ref 4–11)
INSTR WBC: 5.9 K/CUMM (ref 4–11)
IRON LVL: 48 UG/DL (ref 35–135)
LIPEMIC 3+: NEGATIVE
LYMPHOCYTE: 13 %
LYMPHOCYTE: 13 %
LYMPHOCYTE: 14 %
MCH RBC QN AUTO: 31 PG (ref 25–35)
MCH RBC QN AUTO: 32 PG (ref 25–35)
MCH RBC QN AUTO: 32 PG (ref 25–35)
MCHC RBC AUTO-ENTMCNC: 32 G/DL (ref 32–37)
MCV RBC AUTO: 98 FL (ref 78–97)
MCV RBC AUTO: 99 FL (ref 78–97)
MCV RBC AUTO: 99 FL (ref 78–97)
MONOCYTE: 10 %
MONOCYTE: 9 %
MONOCYTE: 9 %
NEUTROPHIL: 71 %
NEUTROPHIL: 73 %
NEUTROPHIL: 74 %
NRBC BLD MANUAL-RTO: 0 % (ref 0–0.2)
PLATELET: 85 K/CUMM (ref 150–450)
PLATELET: 87 K/CUMM (ref 150–450)
PLATELET: 91 K/CUMM (ref 150–450)
POC_GLU: 121 MG/DL (ref 70–99)
POC_GLU: 140 MG/DL (ref 70–99)
POC_GLU: 143 MG/DL (ref 70–99)
POC_GLU: 88 MG/DL (ref 70–99)
POTASSIUM: 3.4 MEQ/L (ref 3.5–5.1)
PROTHROMBIN TIME: 14.1 SECONDS (ref 9.7–11.6)
PROTHROMBIN TIME: 14.3 SECONDS (ref 9.7–11.6)
RBC # BLD: 2.98 M/CUMM (ref 3.7–5.2)
RBC # BLD: 3.01 M/CUMM (ref 3.7–5.2)
RBC # BLD: 3.26 M/CUMM (ref 3.7–5.2)
RDW: 15.3 % (ref 11.5–14.5)
RDW: 15.4 % (ref 11.5–14.5)
RDW: 15.4 % (ref 11.5–14.5)
SODIUM: 135 MEQ/L (ref 136–145)
TCO2: 27 MEQ/L (ref 19–29)
TECH_ID: ABNORMAL
TECH_ID: NORMAL
TOTAL PROTEIN: 5.4 G/DL (ref 6.4–8.3)
TRANSFERRIN LEVEL: 125 MG/DL (ref 173–360)
U CREATININE: 37.56 MG/DL
U SODIUM: <20 MEQ/L
U UREA NITROGEN: 279 MG/DL
UA APPEAR: CLEAR
UA BACTERIA: ABNORMAL
UA BILI: NEGATIVE
UA BLOOD: ABNORMAL
UA COLOR: YELLOW
UA CRYSTAL: ABNORMAL
UA EPITHELIAL: ABNORMAL
UA GLUCOSE: NEGATIVE
UA KETONES: NEGATIVE
UA LEUK EST: ABNORMAL
UA MUCOUS: ABNORMAL
UA NITRITE: NEGATIVE
UA PH: 5.5 (ref 5–7)
UA PROTEIN: ABNORMAL
UA RBC: ABNORMAL
UA SPEC GRAV: 1.01 (ref 1.01–1.02)
UA UROBILINOGEN: 0.2 MG/DL (ref 0.2–1)
UA WBC: ABNORMAL
UA YEAST: ABNORMAL
WBC # BLD: 4.6 K/CUMM (ref 4–11)
WBC # BLD: 5.3 K/CUMM (ref 4–11)
WBC # BLD: 5.9 K/CUMM (ref 4–11)

## 2017-06-17 LAB
ABS LYMPH: 0.8 K/CUMM (ref 1–3.5)
ABS MONO: 0.6 K/CUMM (ref 0.1–0.8)
ABS NEUTRO: 4.8 K/CUMM (ref 2–8)
ANION GAP SERPL CALC-SCNC: 12 MEQ/L (ref 10–20)
APTT PPP: 64 SECONDS (ref 22–30)
BASOPHIL: 0 % (ref 0–1)
BUN SERPL-MCNC: 37 MG/DL (ref 6–20)
CALCIUM: 7.5 MG/DL (ref 8.4–10.2)
CHLORIDE: 97 MEQ/L (ref 97–107)
CREATININE: 2.22 MG/DL (ref 0.6–1.3)
DEVICE SN: ABNORMAL
DIFF_TYPE?: ABNORMAL
EOSINOPHIL: 2 % (ref 0–6)
GLUCOSE LVL: 130 MG/DL (ref 70–99)
HCT VFR BLD CALC: 28 % (ref 33–45)
HEMOLYSIS 2+: NEGATIVE
HGB BLD-MCNC: 9 G/DL (ref 11–15)
IMMATURE GRAN: 1.1 % (ref 0–0.3)
INSTR WBC: 6.4 K/CUMM (ref 4–11)
LYMPHOCYTE: 12 %
MCH RBC QN AUTO: 31 PG (ref 25–35)
MCHC RBC AUTO-ENTMCNC: 32 G/DL (ref 32–37)
MCV RBC AUTO: 97 FL (ref 78–97)
MONOCYTE: 9 %
NEUTROPHIL: 75 %
NRBC BLD MANUAL-RTO: 0 % (ref 0–0.2)
PLATELET: 85 K/CUMM (ref 150–450)
POC_GLU: 126 MG/DL (ref 70–99)
POC_GLU: 134 MG/DL (ref 70–99)
POC_GLU: 145 MG/DL (ref 70–99)
POTASSIUM: 3.7 MEQ/L (ref 3.5–5.1)
RBC # BLD: 2.91 M/CUMM (ref 3.7–5.2)
RDW: 15.1 % (ref 11.5–14.5)
SODIUM: 132 MEQ/L (ref 136–145)
TCO2: 27 MEQ/L (ref 19–29)
TECH_ID: ABNORMAL
WBC # BLD: 6.4 K/CUMM (ref 4–11)

## 2017-06-18 LAB
ABS NEUTRO: 4.5 K/CUMM (ref 2–8)
ANION GAP SERPL CALC-SCNC: 11 MEQ/L (ref 10–20)
BUN SERPL-MCNC: 39 MG/DL (ref 6–20)
CALCIUM: 7.5 MG/DL (ref 8.4–10.2)
CHLORIDE: 98 MEQ/L (ref 97–107)
CREATININE: 2.27 MG/DL (ref 0.6–1.3)
DEVICE SN: ABNORMAL
GLUCOSE LVL: 117 MG/DL (ref 70–99)
HCT VFR BLD CALC: 25 % (ref 33–45)
HEMOLYSIS 2+: NEGATIVE
HEMOLYSIS 2+: NEGATIVE
HGB BLD-MCNC: 8.4 G/DL (ref 11–15)
INSTR WBC: 6.2 K/CUMM (ref 4–11)
MAGNESIUM LEVEL: 1.4 MG/DL (ref 1.6–2.6)
MCH RBC QN AUTO: 32 PG (ref 25–35)
MCHC RBC AUTO-ENTMCNC: 33 G/DL (ref 32–37)
MCV RBC AUTO: 96 FL (ref 78–97)
NRBC BLD MANUAL-RTO: 0 % (ref 0–0.2)
PLATELET: 91 K/CUMM (ref 150–450)
POC_GLU: 113 MG/DL (ref 70–99)
POC_GLU: 170 MG/DL (ref 70–99)
POC_GLU: 182 MG/DL (ref 70–99)
POC_GLU: 212 MG/DL (ref 70–99)
POTASSIUM: 3.7 MEQ/L (ref 3.5–5.1)
RBC # BLD: 2.65 M/CUMM (ref 3.7–5.2)
RDW: 15.5 % (ref 11.5–14.5)
SODIUM: 134 MEQ/L (ref 136–145)
TCO2: 29 MEQ/L (ref 19–29)
TECH_ID: ABNORMAL
WBC # BLD: 6.2 K/CUMM (ref 4–11)

## 2017-06-19 LAB
ABS NEUTRO: 4.6 K/CUMM (ref 2–8)
ANION GAP SERPL CALC-SCNC: 15 MEQ/L (ref 10–20)
BUN SERPL-MCNC: 42 MG/DL (ref 6–20)
CALCIUM: 7.7 MG/DL (ref 8.4–10.2)
CHLORIDE: 97 MEQ/L (ref 97–107)
CREATININE: 2.21 MG/DL (ref 0.6–1.3)
DEVICE SN: ABNORMAL
GLUCOSE LVL: 136 MG/DL (ref 70–99)
HCT VFR BLD CALC: 24 % (ref 33–45)
HEMOLYSIS 2+: NEGATIVE
HEMOLYSIS 2+: NEGATIVE
HGB BLD-MCNC: 8 G/DL (ref 11–15)
INSTR WBC: 6.7 K/CUMM (ref 4–11)
MAGNESIUM LEVEL: 1.7 MG/DL (ref 1.6–2.6)
MCH RBC QN AUTO: 32 PG (ref 25–35)
MCHC RBC AUTO-ENTMCNC: 33 G/DL (ref 32–37)
MCV RBC AUTO: 96 FL (ref 78–97)
NRBC BLD MANUAL-RTO: 0 % (ref 0–0.2)
PLATELET: 99 K/CUMM (ref 150–450)
POC_GLU: 146 MG/DL (ref 70–99)
POC_GLU: 157 MG/DL (ref 70–99)
POC_GLU: 165 MG/DL (ref 70–99)
POC_GLU: 186 MG/DL (ref 70–99)
POTASSIUM: 3.5 MEQ/L (ref 3.5–5.1)
RBC # BLD: 2.53 M/CUMM (ref 3.7–5.2)
RDW: 15.5 % (ref 11.5–14.5)
SODIUM: 135 MEQ/L (ref 136–145)
TCO2: 27 MEQ/L (ref 19–29)
TECH_ID: ABNORMAL
WBC # BLD: 6.7 K/CUMM (ref 4–11)

## 2017-06-20 LAB
ABS NEUTRO: 4.7 K/CUMM (ref 2–8)
ANION GAP SERPL CALC-SCNC: 12 MEQ/L (ref 10–20)
BUN SERPL-MCNC: 41 MG/DL (ref 6–20)
CALCIUM: 7.9 MG/DL (ref 8.4–10.2)
CHLORIDE: 97 MEQ/L (ref 97–107)
CREATININE: 2.02 MG/DL (ref 0.6–1.3)
DEVICE SN: ABNORMAL
GLUCOSE LVL: 135 MG/DL (ref 70–99)
HCT VFR BLD CALC: 24 % (ref 33–45)
HEMOLYSIS 2+: NEGATIVE
HEMOLYSIS 2+: NEGATIVE
HEMOLYSIS 3+: NEGATIVE
HGB BLD-MCNC: 7.7 G/DL (ref 11–15)
INSTR WBC: 6.9 K/CUMM (ref 4–11)
LIPEMIC 4+: NEGATIVE
MAGNESIUM LEVEL: 1.7 MG/DL (ref 1.6–2.6)
MCH RBC QN AUTO: 31 PG (ref 25–35)
MCHC RBC AUTO-ENTMCNC: 32 G/DL (ref 32–37)
MCV RBC AUTO: 97 FL (ref 78–97)
NRBC BLD MANUAL-RTO: 0 % (ref 0–0.2)
PHOSPHORUS: 3.7 MG/DL (ref 2.3–4.7)
PLATELET: 119 K/CUMM (ref 150–450)
POC_GLU: 181 MG/DL (ref 70–99)
POTASSIUM: 3.6 MEQ/L (ref 3.5–5.1)
RBC # BLD: 2.45 M/CUMM (ref 3.7–5.2)
RDW: 15.9 % (ref 11.5–14.5)
SODIUM: 135 MEQ/L (ref 136–145)
TCO2: 30 MEQ/L (ref 19–29)
TECH_ID: ABNORMAL
WBC # BLD: 6.9 K/CUMM (ref 4–11)

## 2017-06-22 ENCOUNTER — HOSPITAL (OUTPATIENT)
Dept: OTHER | Age: 72
End: 2017-06-22
Attending: FAMILY MEDICINE

## 2017-06-23 LAB
A/G RATIO_: 0.4
ABS LYMPH: 0.9 K/CUMM (ref 1–3.5)
ABS MONO: 0.9 K/CUMM (ref 0.1–0.8)
ABS NEUTRO: 6.3 K/CUMM (ref 2–8)
ALBUMIN: 1.6 G/DL (ref 3.5–5)
ALK PHOS: 122 UNIT/L (ref 50–124)
ALT/GPT: 17 UNIT/L (ref 0–55)
ANION GAP SERPL CALC-SCNC: 10 MEQ/L (ref 10–20)
AST/GOT: 33 UNIT/L (ref 5–34)
BASOPHIL: 0 % (ref 0–1)
BILI TOTAL: 0.6 MG/DL (ref 0.2–1)
BNP: 1877 PG/ML (ref 1–100)
BUN SERPL-MCNC: 28 MG/DL (ref 6–20)
CALCIUM: 8.4 MG/DL (ref 8.4–10.2)
CHLORIDE: 96 MEQ/L (ref 97–107)
CREATININE: 1.04 MG/DL (ref 0.6–1.3)
DEVICE SN: ABNORMAL
DIFF_TYPE?: ABNORMAL
EOSINOPHIL: 1 % (ref 0–6)
GLOBULIN_: 4 G/DL (ref 2–4.1)
GLUCOSE LVL: 125 MG/DL (ref 70–99)
HCT VFR BLD CALC: 25 % (ref 33–45)
HEMOLYSIS 2+: NEGATIVE
HEMOLYSIS 4+: NEGATIVE
HEMOLYSIS 4+: NEGATIVE
HGB BLD-MCNC: 7.8 G/DL (ref 11–15)
ICTERIC 4+: NEGATIVE
IMMATURE GRAN: 2 % (ref 0–0.3)
INSTR WBC: 8.4 K/CUMM (ref 4–11)
LIPEMIC 3+: NEGATIVE
LYMPHOCYTE: 11 %
MCH RBC QN AUTO: 31 PG (ref 25–35)
MCHC RBC AUTO-ENTMCNC: 32 G/DL (ref 32–37)
MCV RBC AUTO: 97 FL (ref 78–97)
MONOCYTE: 11 %
NEUTROPHIL: 75 %
NRBC BLD MANUAL-RTO: 0 % (ref 0–0.2)
PLATELET: 222 K/CUMM (ref 150–450)
POC_GLU: 135 MG/DL (ref 70–99)
POC_GLU: 187 MG/DL (ref 70–99)
POC_GLU: 198 MG/DL (ref 70–99)
POTASSIUM: 3.3 MEQ/L (ref 3.5–5.1)
RBC # BLD: 2.53 M/CUMM (ref 3.7–5.2)
RDW: 16.4 % (ref 11.5–14.5)
SODIUM: 135 MEQ/L (ref 136–145)
TCO2: 32 MEQ/L (ref 19–29)
TECH_ID: ABNORMAL
TOTAL PROTEIN: 5.6 G/DL (ref 6.4–8.3)
TROPONIN I: 0.03 NG/ML
UA APPEAR: CLEAR
UA BILI: NEGATIVE
UA BLOOD: NEGATIVE
UA COLOR: YELLOW
UA GLUCOSE: NEGATIVE
UA KETONES: NEGATIVE
UA LEUK EST: NEGATIVE
UA NITRITE: NEGATIVE
UA PH: 5 (ref 5–7)
UA PROTEIN: NEGATIVE
UA SPEC GRAV: 1.01 (ref 1.01–1.02)
UA UROBILINOGEN: 0.2 MG/DL (ref 0.2–1)
WBC # BLD: 8.4 K/CUMM (ref 4–11)

## 2017-06-24 LAB
ABS LYMPH: 0.8 K/CUMM (ref 1–3.5)
ABS MONO: 0.8 K/CUMM (ref 0.1–0.8)
ABS NEUTRO: 6.2 K/CUMM (ref 2–8)
ANION GAP SERPL CALC-SCNC: 12 MEQ/L (ref 10–20)
BASOPHIL: 0 % (ref 0–1)
BUN SERPL-MCNC: 26 MG/DL (ref 6–20)
CALCIUM: 8.5 MG/DL (ref 8.4–10.2)
CHLORIDE: 97 MEQ/L (ref 97–107)
CREATININE: 1.02 MG/DL (ref 0.6–1.3)
DEVICE SN: ABNORMAL
DIFF_TYPE?: ABNORMAL
EOSINOPHIL: 2 % (ref 0–6)
GLUCOSE LVL: 146 MG/DL (ref 70–99)
HCT VFR BLD CALC: 22 % (ref 33–45)
HEMOLYSIS 2+: NEGATIVE
HEMOLYSIS 2+: NEGATIVE
HGB BLD-MCNC: 7.2 G/DL (ref 11–15)
IMMATURE GRAN: 1.5 % (ref 0–0.3)
INSTR WBC: 8.2 K/CUMM (ref 4–11)
LYMPHOCYTE: 10 %
MAGNESIUM LEVEL: 1.3 MG/DL (ref 1.6–2.6)
MCH RBC QN AUTO: 32 PG (ref 25–35)
MCHC RBC AUTO-ENTMCNC: 33 G/DL (ref 32–37)
MCV RBC AUTO: 98 FL (ref 78–97)
MONOCYTE: 10 %
NEUTROPHIL: 75 %
NRBC BLD MANUAL-RTO: 0 % (ref 0–0.2)
PLATELET: 220 K/CUMM (ref 150–450)
POC_GLU: 125 MG/DL (ref 70–99)
POC_GLU: 164 MG/DL (ref 70–99)
POC_GLU: 190 MG/DL (ref 70–99)
POC_GLU: 210 MG/DL (ref 70–99)
POTASSIUM: 3.8 MEQ/L (ref 3.5–5.1)
RBC # BLD: 2.26 M/CUMM (ref 3.7–5.2)
RDW: 16.7 % (ref 11.5–14.5)
SODIUM: 138 MEQ/L (ref 136–145)
TCO2: 33 MEQ/L (ref 19–29)
TECH_ID: ABNORMAL
U CREATININE: 34.52 MG/DL
U PROTEIN: 13 MG/DL
UA APPEAR: CLEAR
UA BACTERIA: ABNORMAL
UA BILI: NEGATIVE
UA BLOOD: ABNORMAL
UA COLOR: YELLOW
UA EPITHELIAL: ABNORMAL
UA GLUCOSE: NEGATIVE
UA KETONES: NEGATIVE
UA LEUK EST: NEGATIVE
UA NITRITE: NEGATIVE
UA PH: 5.5 (ref 5–7)
UA PROTEIN: NEGATIVE
UA RBC: ABNORMAL
UA SPEC GRAV: 1.02 (ref 1.01–1.02)
UA UROBILINOGEN: 0.2 MG/DL (ref 0.2–1)
UA WBC: ABNORMAL
WBC # BLD: 8.2 K/CUMM (ref 4–11)

## 2017-06-25 LAB
ABS LYMPH: 0.8 K/CUMM (ref 1–3.5)
ABS MONO: 0.7 K/CUMM (ref 0.1–0.8)
ABS NEUTRO: 5.6 K/CUMM (ref 2–8)
ANION GAP SERPL CALC-SCNC: 10 MEQ/L (ref 10–20)
BASOPHIL: 0 % (ref 0–1)
BUN SERPL-MCNC: 26 MG/DL (ref 6–20)
CALCIUM: 8.4 MG/DL (ref 8.4–10.2)
CHLORIDE: 97 MEQ/L (ref 97–107)
CREATININE: 0.94 MG/DL (ref 0.6–1.3)
DEVICE SN: ABNORMAL
DIFF_TYPE?: ABNORMAL
EOSINOPHIL: 2 % (ref 0–6)
GLUCOSE LVL: 154 MG/DL (ref 70–99)
HCT VFR BLD CALC: 22 % (ref 33–45)
HEMOLYSIS 2+: NEGATIVE
HEMOLYSIS 2+: NEGATIVE
HGB BLD-MCNC: 7.3 G/DL (ref 11–15)
IMMATURE GRAN: 1.2 % (ref 0–0.3)
INSTR WBC: 7.4 K/CUMM (ref 4–11)
LYMPHOCYTE: 11 %
MAGNESIUM LEVEL: 1.4 MG/DL (ref 1.6–2.6)
MCH RBC QN AUTO: 32 PG (ref 25–35)
MCHC RBC AUTO-ENTMCNC: 33 G/DL (ref 32–37)
MCV RBC AUTO: 99 FL (ref 78–97)
MONOCYTE: 9 %
NEUTROPHIL: 76 %
NRBC BLD MANUAL-RTO: 0 % (ref 0–0.2)
PLATELET: 235 K/CUMM (ref 150–450)
POC_GLU: 138 MG/DL (ref 70–99)
POC_GLU: 140 MG/DL (ref 70–99)
POC_GLU: 168 MG/DL (ref 70–99)
POC_GLU: 177 MG/DL (ref 70–99)
POTASSIUM: 4.1 MEQ/L (ref 3.5–5.1)
RBC # BLD: 2.26 M/CUMM (ref 3.7–5.2)
RDW: 17.2 % (ref 11.5–14.5)
SODIUM: 138 MEQ/L (ref 136–145)
TCO2: 35 MEQ/L (ref 19–29)
TECH_ID: ABNORMAL
WBC # BLD: 7.4 K/CUMM (ref 4–11)

## 2017-06-26 LAB
ABS LYMPH: 0.9 K/CUMM (ref 1–3.5)
ABS MONO: 0.6 K/CUMM (ref 0.1–0.8)
ABS NEUTRO: 4.6 K/CUMM (ref 2–8)
ANION GAP SERPL CALC-SCNC: 12 MEQ/L (ref 10–20)
BASOPHIL: 0 % (ref 0–1)
BUN SERPL-MCNC: 24 MG/DL (ref 6–20)
CALCIUM: 8.1 MG/DL (ref 8.4–10.2)
CHLORIDE: 98 MEQ/L (ref 97–107)
CREATININE: 0.79 MG/DL (ref 0.6–1.3)
DEVICE SN: ABNORMAL
DIFF_TYPE?: ABNORMAL
EOSINOPHIL: 3 % (ref 0–6)
GLUCOSE LVL: 128 MG/DL (ref 70–99)
HCT VFR BLD CALC: 23 % (ref 33–45)
HEMOLYSIS 2+: NEGATIVE
HEMOLYSIS 2+: NEGATIVE
HGB BLD-MCNC: 7.1 G/DL (ref 11–15)
IMMATURE GRAN: 1.1 % (ref 0–0.3)
INSTR WBC: 6.4 K/CUMM (ref 4–11)
LYMPHOCYTE: 13 %
MAGNESIUM LEVEL: 1.1 MG/DL (ref 1.6–2.6)
MCH RBC QN AUTO: 32 PG (ref 25–35)
MCHC RBC AUTO-ENTMCNC: 31 G/DL (ref 32–37)
MCV RBC AUTO: 101 FL (ref 78–97)
MONOCYTE: 10 %
NEUTROPHIL: 72 %
NRBC BLD MANUAL-RTO: 0 % (ref 0–0.2)
PLATELET: 231 K/CUMM (ref 150–450)
POC_GLU: 134 MG/DL (ref 70–99)
POC_GLU: 175 MG/DL (ref 70–99)
POC_GLU: 194 MG/DL (ref 70–99)
POC_GLU: 226 MG/DL (ref 70–99)
POTASSIUM: 3.8 MEQ/L (ref 3.5–5.1)
RBC # BLD: 2.24 M/CUMM (ref 3.7–5.2)
RDW: 17.4 % (ref 11.5–14.5)
SODIUM: 140 MEQ/L (ref 136–145)
TCO2: 34 MEQ/L (ref 19–29)
TECH_ID: ABNORMAL
WBC # BLD: 6.4 K/CUMM (ref 4–11)

## 2017-06-27 LAB
ABS LYMPH: 0.9 K/CUMM (ref 1–3.5)
ABS MONO: 0.6 K/CUMM (ref 0.1–0.8)
ABS NEUTRO: 4.8 K/CUMM (ref 2–8)
ALBUMIN: 2.1 G/DL (ref 3.5–5)
ANION GAP SERPL CALC-SCNC: 9 MEQ/L (ref 10–20)
BASOPHIL: 0 % (ref 0–1)
BUN SERPL-MCNC: 26 MG/DL (ref 6–20)
CALCIUM: 8.7 MG/DL (ref 8.4–10.2)
CHLORIDE: 94 MEQ/L (ref 97–107)
CORR CA*PHOS: 39
CORR CALCIUM: 10.2 MG/DL (ref 8.4–10.2)
CREATININE: 0.92 MG/DL (ref 0.6–1.3)
DEVICE SN: ABNORMAL
DIFF_TYPE?: ABNORMAL
EOSINOPHIL: 2 % (ref 0–6)
GLUCOSE LVL: 181 MG/DL (ref 70–99)
HCT VFR BLD CALC: 22 % (ref 33–45)
HEMOLYSIS 2+: NEGATIVE
HEMOLYSIS 2+: NEGATIVE
HGB BLD-MCNC: 7 G/DL (ref 11–15)
IMMATURE GRAN: 1.1 % (ref 0–0.3)
INSTR WBC: 6.6 K/CUMM (ref 4–11)
LIPEMIC 3+: NEGATIVE
LYMPHOCYTE: 13 %
MAGNESIUM LEVEL: 1.4 MG/DL (ref 1.6–2.6)
MCH RBC QN AUTO: 31 PG (ref 25–35)
MCHC RBC AUTO-ENTMCNC: 31 G/DL (ref 32–37)
MCV RBC AUTO: 101 FL (ref 78–97)
MONOCYTE: 10 %
NEUTROPHIL: 73 %
NRBC BLD MANUAL-RTO: 0 % (ref 0–0.2)
PHOSPHORUS: 3.8 MG/DL (ref 2.3–4.7)
PLATELET: 224 K/CUMM (ref 150–450)
POC_GLU: 142 MG/DL (ref 70–99)
POC_GLU: 164 MG/DL (ref 70–99)
POC_GLU: 178 MG/DL (ref 70–99)
POC_GLU: 196 MG/DL (ref 70–99)
POTASSIUM: 4 MEQ/L (ref 3.5–5.1)
RBC # BLD: 2.23 M/CUMM (ref 3.7–5.2)
RDW: 17.5 % (ref 11.5–14.5)
SODIUM: 141 MEQ/L (ref 136–145)
TCO2: 42 MEQ/L (ref 19–29)
TECH_ID: ABNORMAL
WBC # BLD: 6.6 K/CUMM (ref 4–11)

## 2017-06-28 LAB
ABS LYMPH: 0.8 K/CUMM (ref 1–3.5)
ABS LYMPH: 0.8 K/CUMM (ref 1–3.5)
ABS MONO: 0.6 K/CUMM (ref 0.1–0.8)
ABS MONO: 0.6 K/CUMM (ref 0.1–0.8)
ABS NEUTRO: 4.4 K/CUMM (ref 2–8)
ABS NEUTRO: 5 K/CUMM (ref 2–8)
ANION GAP SERPL CALC-SCNC: 12 MEQ/L (ref 10–20)
BASOPHIL: 0 % (ref 0–1)
BASOPHIL: 0 % (ref 0–1)
BUN SERPL-MCNC: 28 MG/DL (ref 6–20)
CALCIUM: 8.6 MG/DL (ref 8.4–10.2)
CHLORIDE: 93 MEQ/L (ref 97–107)
CREATININE: 0.82 MG/DL (ref 0.6–1.3)
DEVICE SN: ABNORMAL
DIFF_TYPE?: ABNORMAL
DIFF_TYPE?: ABNORMAL
EOSINOPHIL: 2 % (ref 0–6)
EOSINOPHIL: 3 % (ref 0–6)
GLUCOSE LVL: 113 MG/DL (ref 70–99)
HCT VFR BLD CALC: 22 % (ref 33–45)
HCT VFR BLD CALC: 26 % (ref 33–45)
HEMOLYSIS 2+: NEGATIVE
HEMOLYSIS 2+: NEGATIVE
HGB BLD-MCNC: 6.9 G/DL (ref 11–15)
HGB BLD-MCNC: 8.5 G/DL (ref 11–15)
IMMATURE GRAN: 0.6 % (ref 0–0.3)
IMMATURE GRAN: 0.8 % (ref 0–0.3)
INSTR WBC: 6.1 K/CUMM (ref 4–11)
INSTR WBC: 6.6 K/CUMM (ref 4–11)
LYMPHOCYTE: 12 %
LYMPHOCYTE: 14 %
MAGNESIUM LEVEL: 1.8 MG/DL (ref 1.6–2.6)
MCH RBC QN AUTO: 32 PG (ref 25–35)
MCH RBC QN AUTO: 32 PG (ref 25–35)
MCHC RBC AUTO-ENTMCNC: 32 G/DL (ref 32–37)
MCHC RBC AUTO-ENTMCNC: 32 G/DL (ref 32–37)
MCV RBC AUTO: 101 FL (ref 78–97)
MCV RBC AUTO: 98 FL (ref 78–97)
MONOCYTE: 10 %
MONOCYTE: 9 %
NEUTROPHIL: 73 %
NEUTROPHIL: 75 %
NRBC BLD MANUAL-RTO: 0 % (ref 0–0.2)
NRBC BLD MANUAL-RTO: 0 % (ref 0–0.2)
PLATELET: 232 K/CUMM (ref 150–450)
PLATELET: 236 K/CUMM (ref 150–450)
POC_GLU: 124 MG/DL (ref 70–99)
POC_GLU: 192 MG/DL (ref 70–99)
POC_GLU: 202 MG/DL (ref 70–99)
POC_GLU: 220 MG/DL (ref 70–99)
POTASSIUM: 3.9 MEQ/L (ref 3.5–5.1)
RBC # BLD: 2.16 M/CUMM (ref 3.7–5.2)
RBC # BLD: 2.68 M/CUMM (ref 3.7–5.2)
RDW: 17.2 % (ref 11.5–14.5)
RDW: 18.3 % (ref 11.5–14.5)
SODIUM: 141 MEQ/L (ref 136–145)
TCO2: 40 MEQ/L (ref 19–29)
TECH_ID: ABNORMAL
WBC # BLD: 6.1 K/CUMM (ref 4–11)
WBC # BLD: 6.6 K/CUMM (ref 4–11)

## 2017-06-29 LAB
ABS LYMPH: 0.8 K/CUMM (ref 1–3.5)
ABS MONO: 0.7 K/CUMM (ref 0.1–0.8)
ABS NEUTRO: 4.8 K/CUMM (ref 2–8)
ANION GAP SERPL CALC-SCNC: 12 MEQ/L (ref 10–20)
BASOPHIL: 0 % (ref 0–1)
BUN SERPL-MCNC: 31 MG/DL (ref 6–20)
CALCIUM: 9 MG/DL (ref 8.4–10.2)
CHLORIDE: 93 MEQ/L (ref 97–107)
CREATININE: 0.84 MG/DL (ref 0.6–1.3)
DEVICE SN: ABNORMAL
DIFF_TYPE?: ABNORMAL
EOSINOPHIL: 3 % (ref 0–6)
GLUCOSE LVL: 142 MG/DL (ref 70–99)
HCT VFR BLD CALC: 26 % (ref 33–45)
HEMOLYSIS 2+: NEGATIVE
HEMOLYSIS 2+: NEGATIVE
HGB BLD-MCNC: 8.3 G/DL (ref 11–15)
IMMATURE GRAN: 0.8 % (ref 0–0.3)
INSTR WBC: 6.6 K/CUMM (ref 4–11)
LYMPHOCYTE: 12 %
MAGNESIUM LEVEL: 1.6 MG/DL (ref 1.6–2.6)
MCH RBC QN AUTO: 31 PG (ref 25–35)
MCHC RBC AUTO-ENTMCNC: 32 G/DL (ref 32–37)
MCV RBC AUTO: 97 FL (ref 78–97)
MONOCYTE: 11 %
NEUTROPHIL: 73 %
NRBC BLD MANUAL-RTO: 0 % (ref 0–0.2)
PLATELET: 235 K/CUMM (ref 150–450)
POC_GLU: 144 MG/DL (ref 70–99)
POC_GLU: 162 MG/DL (ref 70–99)
POC_GLU: 184 MG/DL (ref 70–99)
POTASSIUM: 4.2 MEQ/L (ref 3.5–5.1)
RBC # BLD: 2.7 M/CUMM (ref 3.7–5.2)
RDW: 18.2 % (ref 11.5–14.5)
SODIUM: 142 MEQ/L (ref 136–145)
TCO2: 41 MEQ/L (ref 19–29)
TECH_ID: ABNORMAL
WBC # BLD: 6.6 K/CUMM (ref 4–11)

## 2017-07-05 ENCOUNTER — HOSPITAL (OUTPATIENT)
Dept: OTHER | Age: 72
End: 2017-07-05
Attending: INTERNAL MEDICINE

## 2017-07-05 LAB
ABBOTT CA-125: 234.9 U/ML
ABS LYMPH: 0.9 K/CUMM (ref 1–3.5)
ABS MONO: 0.5 K/CUMM (ref 0.1–0.8)
ABS NEUTRO: 3.4 K/CUMM (ref 2–8)
BASOPHIL: 0 % (ref 0–1)
DIFF_TYPE?: ABNORMAL
EOSINOPHIL: 3 % (ref 0–6)
HCT VFR BLD CALC: 29 % (ref 33–45)
HEMOLYSIS 4+: NEGATIVE
HGB BLD-MCNC: 8.9 G/DL (ref 11–15)
IMMATURE GRAN: 1 % (ref 0–0.3)
INSTR WBC: 5 K/CUMM (ref 4–11)
LYMPHOCYTE: 17 %
MCH RBC QN AUTO: 31 PG (ref 25–35)
MCHC RBC AUTO-ENTMCNC: 31 G/DL (ref 32–37)
MCV RBC AUTO: 100 FL (ref 78–97)
MONOCYTE: 10 %
NEUTROPHIL: 68 %
NRBC BLD MANUAL-RTO: 0 % (ref 0–0.2)
PLATELET: 252 K/CUMM (ref 150–450)
RBC # BLD: 2.88 M/CUMM (ref 3.7–5.2)
RDW: 17 % (ref 11.5–14.5)
WBC # BLD: 5 K/CUMM (ref 4–11)

## 2017-07-13 ENCOUNTER — CHARTING TRANS (OUTPATIENT)
Dept: OTHER | Age: 72
End: 2017-07-13

## 2017-08-02 ENCOUNTER — HOSPITAL (OUTPATIENT)
Dept: OTHER | Age: 72
End: 2017-08-02
Attending: INTERNAL MEDICINE

## 2017-08-02 LAB
A/G RATIO_: 0.5
ABBOTT CA-125: 107.4 U/ML
ABS LYMPH: 1.5 K/CUMM (ref 1–3.5)
ABS MONO: 0.6 K/CUMM (ref 0.1–0.8)
ABS NEUTRO: 4.1 K/CUMM (ref 2–8)
ALBUMIN: 2.5 G/DL (ref 3.5–5)
ALK PHOS: 147 UNIT/L (ref 50–124)
ALT/GPT: 16 UNIT/L (ref 0–55)
ANION GAP SERPL CALC-SCNC: 14 MEQ/L (ref 10–20)
AST/GOT: 33 UNIT/L (ref 5–34)
BASOPHIL: 0 % (ref 0–1)
BILI TOTAL: 1.5 MG/DL (ref 0.2–1)
BUN SERPL-MCNC: 20 MG/DL (ref 6–20)
CALCIUM: 9.6 MG/DL (ref 8.4–10.2)
CHLORIDE: 99 MEQ/L (ref 97–107)
CREATININE: 0.8 MG/DL (ref 0.6–1.3)
DIFF_TYPE?: ABNORMAL
EOSINOPHIL: 3 % (ref 0–6)
GLOBULIN_: 5 G/DL (ref 2–4.1)
GLUCOSE LVL: 130 MG/DL (ref 70–99)
HCT VFR BLD CALC: 33 % (ref 33–45)
HEMOLYSIS 2+: NEGATIVE
HEMOLYSIS 4+: ABNORMAL
HGB BLD-MCNC: 10.8 G/DL (ref 11–15)
IMMATURE GRAN: 0.8 % (ref 0–0.3)
INSTR WBC: 6.5 K/CUMM (ref 4–11)
LIPEMIC 3+: NEGATIVE
LYMPHOCYTE: 24 %
MCH RBC QN AUTO: 32 PG (ref 25–35)
MCHC RBC AUTO-ENTMCNC: 33 G/DL (ref 32–37)
MCV RBC AUTO: 97 FL (ref 78–97)
MONOCYTE: 10 %
NEUTROPHIL: 63 %
NRBC BLD MANUAL-RTO: 0 % (ref 0–0.2)
PLATELET: 205 K/CUMM (ref 150–450)
POTASSIUM: 3.7 MEQ/L (ref 3.5–5.1)
RBC # BLD: 3.38 M/CUMM (ref 3.7–5.2)
RDW: 14.6 % (ref 11.5–14.5)
SODIUM: 139 MEQ/L (ref 136–145)
TCO2: 30 MEQ/L (ref 19–29)
TOTAL PROTEIN: 7.5 G/DL (ref 6.4–8.3)
WBC # BLD: 6.5 K/CUMM (ref 4–11)

## 2017-08-30 LAB
A/G RATIO_: 0.5
ABS NEUTRO: 4.2 K/CUMM (ref 2–8)
ALBUMIN: 2.4 G/DL (ref 3.5–5)
ALK PHOS: 188 UNIT/L (ref 50–124)
ALT/GPT: 19 UNIT/L (ref 0–55)
ANION GAP SERPL CALC-SCNC: 12 MEQ/L (ref 10–20)
AST/GOT: 39 UNIT/L (ref 5–34)
BILI TOTAL: 1 MG/DL (ref 0.2–1)
BUN SERPL-MCNC: 21 MG/DL (ref 6–20)
CALCIUM: 9.2 MG/DL (ref 8.4–10.2)
CHLORIDE: 102 MEQ/L (ref 97–107)
CREATININE: 0.76 MG/DL (ref 0.6–1.3)
GLOBULIN_: 4.6 G/DL (ref 2–4.1)
GLUCOSE LVL: 102 MG/DL (ref 70–99)
HCT VFR BLD CALC: 35 % (ref 33–45)
HEMOLYSIS 2+: NEGATIVE
HGB BLD-MCNC: 11.4 G/DL (ref 11–15)
INSTR WBC: 7 K/CUMM (ref 4–11)
LIPEMIC 3+: NEGATIVE
MCH RBC QN AUTO: 32 PG (ref 25–35)
MCHC RBC AUTO-ENTMCNC: 33 G/DL (ref 32–37)
MCV RBC AUTO: 98 FL (ref 78–97)
NRBC BLD MANUAL-RTO: 0 % (ref 0–0.2)
PLATELET: 244 K/CUMM (ref 150–450)
POTASSIUM: 3.6 MEQ/L (ref 3.5–5.1)
RBC # BLD: 3.57 M/CUMM (ref 3.7–5.2)
RDW: 14.2 % (ref 11.5–14.5)
SODIUM: 138 MEQ/L (ref 136–145)
TCO2: 28 MEQ/L (ref 19–29)
TOTAL PROTEIN: 7 G/DL (ref 6.4–8.3)
UA APPEAR: CLEAR
UA BACTERIA: ABNORMAL
UA BILI: NEGATIVE
UA BLOOD: NEGATIVE
UA COLOR: YELLOW
UA EPITHELIAL: ABNORMAL
UA GLUCOSE: NEGATIVE
UA KETONES: NEGATIVE
UA LEUK EST: ABNORMAL
UA NITRITE: NEGATIVE
UA PH: 7 (ref 5–7)
UA PROTEIN: NEGATIVE
UA RBC: ABNORMAL
UA SPEC GRAV: 1.01 (ref 1.01–1.02)
UA UROBILINOGEN: 0.2 MG/DL (ref 0.2–1)
UA WBC: ABNORMAL
WBC # BLD: 7 K/CUMM (ref 4–11)

## 2017-09-06 ENCOUNTER — HOSPITAL (OUTPATIENT)
Dept: OTHER | Age: 72
End: 2017-09-06
Attending: INTERNAL MEDICINE

## 2017-10-11 ENCOUNTER — HOSPITAL (OUTPATIENT)
Dept: OTHER | Age: 72
End: 2017-10-11
Attending: INTERNAL MEDICINE

## 2017-10-11 LAB
A/G RATIO_: 0.5
ABS LYMPH: 1.5 K/CUMM (ref 1–3.5)
ABS MONO: 0.5 K/CUMM (ref 0.1–0.8)
ABS NEUTRO: 5.3 K/CUMM (ref 2–8)
ALBUMIN: 2.4 G/DL (ref 3.5–5)
ALK PHOS: 213 UNIT/L (ref 50–124)
ALT/GPT: 31 UNIT/L (ref 0–55)
ANION GAP SERPL CALC-SCNC: 14 MEQ/L (ref 10–20)
AST/GOT: 44 UNIT/L (ref 5–34)
BASOPHIL: 0 % (ref 0–1)
BILI TOTAL: 1.4 MG/DL (ref 0.2–1)
BUN SERPL-MCNC: 21 MG/DL (ref 6–20)
CALCIUM: 9.2 MG/DL (ref 8.4–10.2)
CHLORIDE: 101 MEQ/L (ref 97–107)
CREATININE: 0.82 MG/DL (ref 0.6–1.3)
DIFF_TYPE?: ABNORMAL
EOSINOPHIL: 3 % (ref 0–6)
GLOBULIN_: 4.7 G/DL (ref 2–4.1)
GLUCOSE LVL: 97 MG/DL (ref 70–99)
HCT VFR BLD CALC: 37 % (ref 33–45)
HEMOLYSIS 2+: NEGATIVE
HGB BLD-MCNC: 12.1 G/DL (ref 11–15)
IMMATURE GRAN: 0.5 % (ref 0–0.3)
INSTR WBC: 7.6 K/CUMM (ref 4–11)
LIPEMIC 3+: NEGATIVE
LYMPHOCYTE: 20 %
MCH RBC QN AUTO: 32 PG (ref 25–35)
MCHC RBC AUTO-ENTMCNC: 33 G/DL (ref 32–37)
MCV RBC AUTO: 99 FL (ref 78–97)
MONOCYTE: 7 %
NEUTROPHIL: 70 %
NRBC BLD MANUAL-RTO: 0 % (ref 0–0.2)
PLATELET: 219 K/CUMM (ref 150–450)
POTASSIUM: 4.1 MEQ/L (ref 3.5–5.1)
RBC # BLD: 3.72 M/CUMM (ref 3.7–5.2)
RDW: 13.9 % (ref 11.5–14.5)
SODIUM: 141 MEQ/L (ref 136–145)
TCO2: 30 MEQ/L (ref 19–29)
TOTAL PROTEIN: 7.1 G/DL (ref 6.4–8.3)
WBC # BLD: 7.6 K/CUMM (ref 4–11)

## 2017-10-12 ENCOUNTER — IMAGING SERVICES (OUTPATIENT)
Dept: CT IMAGING | Age: 72
End: 2017-10-12
Attending: RADIOLOGY

## 2017-10-12 ENCOUNTER — APPOINTMENT (OUTPATIENT)
Dept: INTERPRETER SERVICES | Age: 72
End: 2017-10-12

## 2017-10-12 ENCOUNTER — OFFICE VISIT (OUTPATIENT)
Dept: CARDIOLOGY | Age: 72
End: 2017-10-12

## 2017-10-12 VITALS — DIASTOLIC BLOOD PRESSURE: 62 MMHG | SYSTOLIC BLOOD PRESSURE: 169 MMHG | HEART RATE: 56 BPM | OXYGEN SATURATION: 96 %

## 2017-10-12 VITALS
HEIGHT: 60 IN | DIASTOLIC BLOOD PRESSURE: 72 MMHG | OXYGEN SATURATION: 97 % | BODY MASS INDEX: 29.98 KG/M2 | HEART RATE: 67 BPM | WEIGHT: 152.7 LBS | SYSTOLIC BLOOD PRESSURE: 148 MMHG

## 2017-10-12 DIAGNOSIS — I71.20 ANEURYSM OF THORACIC AORTA (CMD): ICD-10-CM

## 2017-10-12 DIAGNOSIS — I71.03 DISSECTION OF THORACOABDOMINAL AORTA (CMD): Primary | ICD-10-CM

## 2017-10-12 LAB — CREAT SERPL-MCNC: 1 MG/DL

## 2017-10-12 PROCEDURE — 74174 CTA ABD&PLVS W/CONTRAST: CPT | Performed by: INTERNAL MEDICINE

## 2017-10-12 PROCEDURE — 99213 OFFICE O/P EST LOW 20 MIN: CPT | Performed by: RADIOLOGY

## 2017-10-12 PROCEDURE — 82565 ASSAY OF CREATININE: CPT | Performed by: RADIOLOGY

## 2017-11-01 ENCOUNTER — HOSPITAL (OUTPATIENT)
Dept: OTHER | Age: 72
End: 2017-11-01
Attending: INTERNAL MEDICINE

## 2017-11-01 LAB
A/G RATIO_: 0.6
ABS LYMPH: 1.5 K/CUMM (ref 1–3.5)
ABS MONO: 0.6 K/CUMM (ref 0.1–0.8)
ABS NEUTRO: 4.1 K/CUMM (ref 2–8)
ALBUMIN: 2.5 G/DL (ref 3.5–5)
ALK PHOS: 197 UNIT/L (ref 50–124)
ALT/GPT: 40 UNIT/L (ref 0–55)
ANION GAP SERPL CALC-SCNC: 12 MEQ/L (ref 10–20)
AST/GOT: 54 UNIT/L (ref 5–34)
BASOPHIL: 0 % (ref 0–1)
BILI TOTAL: 1.3 MG/DL (ref 0.2–1)
BUN SERPL-MCNC: 23 MG/DL (ref 6–20)
CALCIUM: 9.3 MG/DL (ref 8.4–10.2)
CHLORIDE: 103 MEQ/L (ref 97–107)
CREATININE: 0.76 MG/DL (ref 0.6–1.3)
DIFF_TYPE?: ABNORMAL
EOSINOPHIL: 5 % (ref 0–6)
GLOBULIN_: 4.5 G/DL (ref 2–4.1)
GLUCOSE LVL: 109 MG/DL (ref 70–99)
HCT VFR BLD CALC: 37 % (ref 33–45)
HEMOLYSIS 2+: NEGATIVE
HGB BLD-MCNC: 12.4 G/DL (ref 11–15)
IMMATURE GRAN: 0.5 % (ref 0–0.3)
INSTR WBC: 6.6 K/CUMM (ref 4–11)
LIPEMIC 3+: NEGATIVE
LYMPHOCYTE: 23 %
MCH RBC QN AUTO: 33 PG (ref 25–35)
MCHC RBC AUTO-ENTMCNC: 33 G/DL (ref 32–37)
MCV RBC AUTO: 99 FL (ref 78–97)
MONOCYTE: 8 %
NEUTROPHIL: 63 %
NRBC BLD MANUAL-RTO: 0 % (ref 0–0.2)
PLATELET: 166 K/CUMM (ref 150–450)
POTASSIUM: 3.8 MEQ/L (ref 3.5–5.1)
RBC # BLD: 3.78 M/CUMM (ref 3.7–5.2)
RDW: 13.9 % (ref 11.5–14.5)
SODIUM: 141 MEQ/L (ref 136–145)
TCO2: 30 MEQ/L (ref 19–29)
TOTAL PROTEIN: 7 G/DL (ref 6.4–8.3)
UA APPEAR: CLEAR
UA BACTERIA: ABNORMAL
UA BILI: NEGATIVE
UA BLOOD: NEGATIVE
UA COLOR: YELLOW
UA EPITHELIAL: ABNORMAL
UA GLUCOSE: NEGATIVE
UA KETONES: NEGATIVE
UA LEUK EST: ABNORMAL
UA MUCOUS: ABNORMAL
UA NITRITE: NEGATIVE
UA PH: 6.5 (ref 5–7)
UA PROTEIN: NEGATIVE
UA RBC: ABNORMAL
UA SPEC GRAV: 1.01 (ref 1.01–1.02)
UA UROBILINOGEN: 0.2 MG/DL (ref 0.2–1)
UA WBC: ABNORMAL
WBC # BLD: 6.6 K/CUMM (ref 4–11)

## 2017-12-09 ENCOUNTER — HOSPITAL (OUTPATIENT)
Dept: OTHER | Age: 72
End: 2017-12-09
Attending: INTERNAL MEDICINE

## 2017-12-13 ENCOUNTER — HOSPITAL (OUTPATIENT)
Dept: OTHER | Age: 72
End: 2017-12-13
Attending: INTERNAL MEDICINE

## 2017-12-13 LAB
UA APPEAR: CLEAR
UA BACTERIA: ABNORMAL
UA BILI: NEGATIVE
UA BLOOD: NEGATIVE
UA COLOR: YELLOW
UA EPITHELIAL: ABNORMAL
UA GLUCOSE: NEGATIVE
UA KETONES: NEGATIVE
UA LEUK EST: ABNORMAL
UA NITRITE: NEGATIVE
UA PH: 8 (ref 5–7)
UA PROTEIN: NEGATIVE
UA RBC: ABNORMAL
UA SPEC GRAV: 1.01 (ref 1.01–1.02)
UA UROBILINOGEN: 0.2 MG/DL (ref 0.2–1)
UA WBC: ABNORMAL

## 2018-01-03 ENCOUNTER — HOSPITAL (OUTPATIENT)
Dept: OTHER | Age: 73
End: 2018-01-03
Attending: INTERNAL MEDICINE

## 2018-01-15 ENCOUNTER — HOSPITAL (OUTPATIENT)
Dept: OTHER | Age: 73
End: 2018-01-15
Attending: FAMILY MEDICINE

## 2018-01-15 LAB
A/G RATIO_: 0.6
ABS LYMPH: 1.6 K/CUMM (ref 1–3.5)
ABS MONO: 0.7 K/CUMM (ref 0.1–0.8)
ABS NEUTRO: 4.3 K/CUMM (ref 2–8)
ALBUMIN: 2.7 G/DL (ref 3.5–5)
ALK PHOS: 108 UNIT/L (ref 50–124)
ALT/GPT: 16 UNIT/L (ref 0–55)
ANION GAP SERPL CALC-SCNC: 14 MEQ/L (ref 10–20)
APTT PPP: 27 SECONDS (ref 22–30)
AST/GOT: 28 UNIT/L (ref 5–34)
BASOPHIL: 0 % (ref 0–1)
BILI TOTAL: 1.5 MG/DL (ref 0.2–1)
BUN SERPL-MCNC: 25 MG/DL (ref 6–20)
CALCIUM: 9.4 MG/DL (ref 8.4–10.2)
CHLORIDE: 106 MEQ/L (ref 97–107)
CREATININE: 1.04 MG/DL (ref 0.6–1.3)
DEVICE SN: ABNORMAL
DIFF_TYPE?: ABNORMAL
EOSINOPHIL: 4 % (ref 0–6)
GLOBULIN_: 4.8 G/DL (ref 2–4.1)
GLUCOSE LVL: 118 MG/DL (ref 70–99)
HCT VFR BLD CALC: 39 % (ref 33–45)
HEMOLYSIS 2+: ABNORMAL
HEMOLYSIS 4+: NORMAL
HGB BLD-MCNC: 12.8 G/DL (ref 11–15)
ICTERIC 4+: NEGATIVE
IMMATURE GRAN: 0.4 % (ref 0–0.3)
INR: 1.1 (ref 0.9–1.1)
INSTR WBC: 6.9 K/CUMM (ref 4–11)
LIPEMIC 3+: NEGATIVE
LYMPHOCYTE: 23 %
MCH RBC QN AUTO: 32 PG (ref 25–35)
MCHC RBC AUTO-ENTMCNC: 33 G/DL (ref 32–37)
MCV RBC AUTO: 100 FL (ref 78–97)
MONOCYTE: 10 %
NEUTROPHIL: 63 %
NRBC BLD MANUAL-RTO: 0 % (ref 0–0.2)
PLATELET: 195 K/CUMM (ref 150–450)
POC_GLU: 106 MG/DL (ref 70–99)
POC_GLU: 112 MG/DL (ref 70–99)
POC_GLU: 150 MG/DL (ref 70–99)
POTASSIUM: 4.3 MEQ/L (ref 3.5–5.1)
PROTHROMBIN TIME: 11.3 SECONDS (ref 9.7–11.6)
RBC # BLD: 3.94 M/CUMM (ref 3.7–5.2)
RDW: 14.2 % (ref 11.5–14.5)
SODIUM: 141 MEQ/L (ref 136–145)
TCO2: 25 MEQ/L (ref 19–29)
TECH_ID: ABNORMAL
TOTAL PROTEIN: 7.5 G/DL (ref 6.4–8.3)
TROPONIN I: 0.05 NG/ML
WBC # BLD: 6.9 K/CUMM (ref 4–11)

## 2018-01-16 LAB
A/G RATIO_: 0.6
ABS LYMPH: 1.4 K/CUMM (ref 1–3.5)
ABS MONO: 0.6 K/CUMM (ref 0.1–0.8)
ABS NEUTRO: 3.5 K/CUMM (ref 2–8)
ALBUMIN: 2.4 G/DL (ref 3.5–5)
ALK PHOS: 93 UNIT/L (ref 50–124)
ALT/GPT: 12 UNIT/L (ref 0–55)
ANION GAP SERPL CALC-SCNC: 11 MEQ/L (ref 10–20)
AST/GOT: 20 UNIT/L (ref 5–34)
BASOPHIL: 0 % (ref 0–1)
BILI TOTAL: 1.2 MG/DL (ref 0.2–1)
BUN SERPL-MCNC: 25 MG/DL (ref 6–20)
CALCIUM: 9 MG/DL (ref 8.4–10.2)
CHLORIDE: 110 MEQ/L (ref 97–107)
CREATININE: 0.97 MG/DL (ref 0.6–1.3)
DEVICE SN: ABNORMAL
DIFF_TYPE?: ABNORMAL
EOSINOPHIL: 4 % (ref 0–6)
GLOBULIN_: 4.1 G/DL (ref 2–4.1)
GLUCOSE LVL: 105 MG/DL (ref 70–99)
HCT VFR BLD CALC: 36 % (ref 33–45)
HEMOLYSIS 2+: NEGATIVE
HGB BLD-MCNC: 11.7 G/DL (ref 11–15)
IMMATURE GRAN: 0.2 % (ref 0–0.3)
INSTR WBC: 5.8 K/CUMM (ref 4–11)
LIPEMIC 3+: NEGATIVE
LYMPHOCYTE: 25 %
MCH RBC QN AUTO: 33 PG (ref 25–35)
MCHC RBC AUTO-ENTMCNC: 33 G/DL (ref 32–37)
MCV RBC AUTO: 100 FL (ref 78–97)
MONOCYTE: 11 %
NEUTROPHIL: 60 %
NRBC BLD MANUAL-RTO: 0 % (ref 0–0.2)
PLATELET: 178 K/CUMM (ref 150–450)
POC_GLU: 108 MG/DL (ref 70–99)
POC_GLU: 134 MG/DL (ref 70–99)
POC_GLU: 181 MG/DL (ref 70–99)
POC_GLU: 225 MG/DL (ref 70–99)
POTASSIUM: 4.5 MEQ/L (ref 3.5–5.1)
RBC # BLD: 3.54 M/CUMM (ref 3.7–5.2)
RDW: 13.9 % (ref 11.5–14.5)
SODIUM: 142 MEQ/L (ref 136–145)
TCO2: 26 MEQ/L (ref 19–29)
TECH_ID: ABNORMAL
TOTAL PROTEIN: 6.5 G/DL (ref 6.4–8.3)
WBC # BLD: 5.8 K/CUMM (ref 4–11)

## 2018-01-17 LAB
DEVICE SN: ABNORMAL
DEVICE SN: NORMAL
POC_GLU: 131 MG/DL (ref 70–99)
POC_GLU: 143 MG/DL (ref 70–99)
POC_GLU: 172 MG/DL (ref 70–99)
POC_GLU: 97 MG/DL (ref 70–99)
TECH_ID: ABNORMAL
TECH_ID: NORMAL

## 2018-01-18 LAB
DEVICE SN: ABNORMAL
DEVICE SN: ABNORMAL
POC_GLU: 125 MG/DL (ref 70–99)
POC_GLU: 221 MG/DL (ref 70–99)
TECH_ID: ABNORMAL
TECH_ID: ABNORMAL

## 2018-02-14 ENCOUNTER — HOSPITAL (OUTPATIENT)
Dept: OTHER | Age: 73
End: 2018-02-14
Attending: INTERNAL MEDICINE

## 2018-02-14 LAB
A/G RATIO_: 0.6
ABBOTT CA-125: 26.9 U/ML
ABS LYMPH: 1.3 K/CUMM (ref 1–3.5)
ABS MONO: 0.6 K/CUMM (ref 0.1–0.8)
ABS NEUTRO: 4.2 K/CUMM (ref 2–8)
ALBUMIN: 2.4 G/DL (ref 3.5–5)
ALK PHOS: 102 UNIT/L (ref 50–124)
ALT/GPT: 23 UNIT/L (ref 0–55)
ANION GAP SERPL CALC-SCNC: 12 MEQ/L (ref 10–20)
AST/GOT: 31 UNIT/L (ref 5–34)
BASOPHIL: 0 % (ref 0–1)
BILI TOTAL: 1.3 MG/DL (ref 0.2–1)
BUN SERPL-MCNC: 19 MG/DL (ref 6–20)
CALCIUM: 8.7 MG/DL (ref 8.4–10.2)
CHLORIDE: 108 MEQ/L (ref 97–107)
CREATININE: 0.77 MG/DL (ref 0.6–1.3)
DIFF_TYPE?: ABNORMAL
EOSINOPHIL: 3 % (ref 0–6)
GLOBULIN_: 4 G/DL (ref 2–4.1)
GLUCOSE LVL: 91 MG/DL (ref 70–99)
HCT VFR BLD CALC: 34 % (ref 33–45)
HEMOLYSIS 2+: NEGATIVE
HGB BLD-MCNC: 10.9 G/DL (ref 11–15)
IMMATURE GRAN: 0.3 % (ref 0–0.3)
INSTR WBC: 6.3 K/CUMM (ref 4–11)
LIPEMIC 3+: NEGATIVE
LYMPHOCYTE: 21 %
MCH RBC QN AUTO: 32 PG (ref 25–35)
MCHC RBC AUTO-ENTMCNC: 32 G/DL (ref 32–37)
MCV RBC AUTO: 99 FL (ref 78–97)
MONOCYTE: 10 %
NEUTROPHIL: 66 %
NRBC BLD MANUAL-RTO: 0 % (ref 0–0.2)
PLATELET: 164 K/CUMM (ref 150–450)
POTASSIUM: 3.3 MEQ/L (ref 3.5–5.1)
RBC # BLD: 3.38 M/CUMM (ref 3.7–5.2)
RDW: 13.7 % (ref 11.5–14.5)
SODIUM: 141 MEQ/L (ref 136–145)
TCO2: 24 MEQ/L (ref 19–29)
TOTAL PROTEIN: 6.4 G/DL (ref 6.4–8.3)
WBC # BLD: 6.3 K/CUMM (ref 4–11)

## 2018-02-16 ENCOUNTER — HOSPITAL (OUTPATIENT)
Dept: OTHER | Age: 73
End: 2018-02-16
Attending: EMERGENCY MEDICINE

## 2018-02-16 LAB
ABS LYMPH: 1.3 K/CUMM (ref 1–3.5)
ABS MONO: 0.6 K/CUMM (ref 0.1–0.8)
ABS NEUTRO: 3.4 K/CUMM (ref 2–8)
ANION GAP SERPL CALC-SCNC: 11 MEQ/L (ref 10–20)
APTT PPP: 68 SECONDS (ref 22–30)
BASOPHIL: 0 % (ref 0–1)
BUN SERPL-MCNC: 23 MG/DL (ref 6–20)
CALCIUM: 9.1 MG/DL (ref 8.4–10.2)
CHLORIDE: 104 MEQ/L (ref 97–107)
CREATININE: 0.93 MG/DL (ref 0.6–1.3)
DIFF_TYPE?: ABNORMAL
EOSINOPHIL: 3 % (ref 0–6)
GLUCOSE LVL: 135 MG/DL (ref 70–99)
HCT VFR BLD CALC: 33 % (ref 33–45)
HEMOLYSIS 2+: NEGATIVE
HGB BLD-MCNC: 10.8 G/DL (ref 11–15)
IMMATURE GRAN: 0.4 % (ref 0–0.3)
INR: 1.3 (ref 0.9–1.1)
INSTR WBC: 5.6 K/CUMM (ref 4–11)
LYMPHOCYTE: 23 %
MCH RBC QN AUTO: 32 PG (ref 25–35)
MCHC RBC AUTO-ENTMCNC: 33 G/DL (ref 32–37)
MCV RBC AUTO: 99 FL (ref 78–97)
MONOCYTE: 11 %
NEUTROPHIL: 61 %
NRBC BLD MANUAL-RTO: 0 % (ref 0–0.2)
PLATELET: 154 K/CUMM (ref 150–450)
POTASSIUM: 3.7 MEQ/L (ref 3.5–5.1)
PROTHROMBIN TIME: 12.7 SECONDS (ref 9.7–11.6)
RBC # BLD: 3.33 M/CUMM (ref 3.7–5.2)
RDW: 13.5 % (ref 11.5–14.5)
SODIUM: 139 MEQ/L (ref 136–145)
TCO2: 28 MEQ/L (ref 19–29)
WBC # BLD: 5.6 K/CUMM (ref 4–11)

## 2018-02-20 ENCOUNTER — HOSPITAL (OUTPATIENT)
Dept: OTHER | Age: 73
End: 2018-02-20
Attending: EMERGENCY MEDICINE

## 2018-02-20 LAB
ABS LYMPH: 1.2 K/CUMM (ref 1–3.5)
ABS MONO: 0.5 K/CUMM (ref 0.1–0.8)
ABS NEUTRO: 3.1 K/CUMM (ref 2–8)
ANION GAP SERPL CALC-SCNC: 14 MEQ/L (ref 10–20)
APTT PPP: 41 SECONDS (ref 22–30)
BASOPHIL: 1 % (ref 0–1)
BUN SERPL-MCNC: 20 MG/DL (ref 6–20)
CALCIUM: 8.9 MG/DL (ref 8.4–10.2)
CHLORIDE: 105 MEQ/L (ref 97–107)
CREATININE: 0.92 MG/DL (ref 0.6–1.3)
DIFF_TYPE?: ABNORMAL
EOSINOPHIL: 4 % (ref 0–6)
GLUCOSE LVL: 140 MG/DL (ref 70–99)
HCT VFR BLD CALC: 33 % (ref 33–45)
HEMOLYSIS 2+: NEGATIVE
HGB BLD-MCNC: 11 G/DL (ref 11–15)
IMMATURE GRAN: 0.6 % (ref 0–0.3)
INR: 1.2 (ref 0.9–1.1)
INSTR WBC: 5.1 K/CUMM (ref 4–11)
LYMPHOCYTE: 24 %
MCH RBC QN AUTO: 33 PG (ref 25–35)
MCHC RBC AUTO-ENTMCNC: 33 G/DL (ref 32–37)
MCV RBC AUTO: 101 FL (ref 78–97)
MONOCYTE: 10 %
NEUTROPHIL: 61 %
NRBC BLD MANUAL-RTO: 0 % (ref 0–0.2)
PLATELET: 166 K/CUMM (ref 150–450)
POTASSIUM: 3.6 MEQ/L (ref 3.5–5.1)
PROTHROMBIN TIME: 12.3 SECONDS (ref 9.7–11.6)
RBC # BLD: 3.32 M/CUMM (ref 3.7–5.2)
RDW: 13.8 % (ref 11.5–14.5)
SODIUM: 141 MEQ/L (ref 136–145)
TCO2: 26 MEQ/L (ref 19–29)
UA APPEAR: ABNORMAL
UA BACTERIA: ABNORMAL
UA BILI: NEGATIVE
UA BLOOD: ABNORMAL
UA COLOR: ABNORMAL
UA EPITHELIAL: ABNORMAL
UA GLUCOSE: NEGATIVE
UA KETONES: NEGATIVE
UA LEUK EST: NEGATIVE
UA NITRITE: NEGATIVE
UA PH: 6 (ref 5–7)
UA PROTEIN: ABNORMAL
UA RBC: ABNORMAL
UA SPEC GRAV: 1.01 (ref 1.01–1.02)
UA UROBILINOGEN: 0.2 MG/DL (ref 0.2–1)
UA WBC: ABNORMAL
WBC # BLD: 5.1 K/CUMM (ref 4–11)

## 2018-02-21 ENCOUNTER — HOSPITAL (OUTPATIENT)
Dept: OTHER | Age: 73
End: 2018-02-21
Attending: INTERNAL MEDICINE

## 2018-02-24 ENCOUNTER — WALK IN (OUTPATIENT)
Dept: URGENT CARE | Age: 73
End: 2018-02-24

## 2018-02-24 ENCOUNTER — IMAGING SERVICES (OUTPATIENT)
Dept: GENERAL RADIOLOGY | Age: 73
End: 2018-02-24
Attending: PHYSICIAN ASSISTANT

## 2018-02-24 VITALS
TEMPERATURE: 98.3 F | HEART RATE: 62 BPM | WEIGHT: 146 LBS | BODY MASS INDEX: 28.66 KG/M2 | SYSTOLIC BLOOD PRESSURE: 110 MMHG | DIASTOLIC BLOOD PRESSURE: 60 MMHG | HEIGHT: 60 IN | OXYGEN SATURATION: 94 %

## 2018-02-24 DIAGNOSIS — J06.9 VIRAL URI WITH COUGH: Primary | ICD-10-CM

## 2018-02-24 DIAGNOSIS — R05.9 COUGH: ICD-10-CM

## 2018-02-24 DIAGNOSIS — S29.012A STRAIN OF THORACIC PARASPINAL MUSCLES EXCLUDING T1 AND T2 LEVELS, INITIAL ENCOUNTER: ICD-10-CM

## 2018-02-24 PROCEDURE — 71046 X-RAY EXAM CHEST 2 VIEWS: CPT | Performed by: RADIOLOGY

## 2018-02-24 PROCEDURE — 99214 OFFICE O/P EST MOD 30 MIN: CPT | Performed by: PHYSICIAN ASSISTANT

## 2018-03-07 ENCOUNTER — HOSPITAL (OUTPATIENT)
Dept: OTHER | Age: 73
End: 2018-03-07
Attending: INTERNAL MEDICINE

## 2018-03-21 ENCOUNTER — HOSPITAL (OUTPATIENT)
Dept: OTHER | Age: 73
End: 2018-03-21
Attending: INTERNAL MEDICINE

## 2018-03-28 ENCOUNTER — HOSPITAL (OUTPATIENT)
Dept: OTHER | Age: 73
End: 2018-03-28
Attending: RADIOLOGY

## 2018-04-04 ENCOUNTER — HOSPITAL (OUTPATIENT)
Dept: OTHER | Age: 73
End: 2018-04-04
Attending: RADIOLOGY

## 2018-04-04 ENCOUNTER — PRIOR ORIGINAL RECORDS (OUTPATIENT)
Dept: OTHER | Age: 73
End: 2018-04-04

## 2018-04-06 ENCOUNTER — PRIOR ORIGINAL RECORDS (OUTPATIENT)
Dept: OTHER | Age: 73
End: 2018-04-06

## 2018-04-08 ENCOUNTER — HOSPITAL (OUTPATIENT)
Dept: OTHER | Age: 73
End: 2018-04-08
Attending: EMERGENCY MEDICINE

## 2018-04-08 LAB
A/G RATIO_: 0.5
ABS LYMPH: 1.1 K/CUMM (ref 1–3.5)
ABS MONO: 0.6 K/CUMM (ref 0.1–0.8)
ABS NEUTRO: 4.4 K/CUMM (ref 2–8)
ALBUMIN: 2 G/DL (ref 3.5–5)
ALK PHOS: 97 UNIT/L (ref 50–124)
ALT/GPT: 9 UNIT/L (ref 0–55)
ANION GAP SERPL CALC-SCNC: 12 MEQ/L (ref 10–20)
APTT PPP: 28 SECONDS (ref 22–30)
AST/GOT: 22 UNIT/L (ref 5–34)
BASOPHIL: 0 % (ref 0–1)
BILI TOTAL: 0.6 MG/DL (ref 0.2–1)
BUN SERPL-MCNC: 16 MG/DL (ref 6–20)
CALCIUM: 8.6 MG/DL (ref 8.4–10.2)
CHLORIDE: 101 MEQ/L (ref 97–107)
CREATININE: 0.76 MG/DL (ref 0.6–1.3)
DIFF_TYPE?: ABNORMAL
EOSINOPHIL: 4 % (ref 0–6)
GLOBULIN_: 3.9 G/DL (ref 2–4.1)
GLUCOSE LVL: 94 MG/DL (ref 70–99)
HCT VFR BLD CALC: 30 % (ref 33–45)
HEMOLYSIS 2+: NEGATIVE
HEMOLYSIS 4+: NEGATIVE
HGB BLD-MCNC: 9.1 G/DL (ref 11–15)
ICTERIC 4+: NEGATIVE
IMMATURE GRAN: 0.9 % (ref 0–0.3)
INR: 1.2 (ref 0.9–1.1)
INSTR WBC: 6.5 K/CUMM (ref 4–11)
LIPEMIC 3+: NEGATIVE
LYMPHOCYTE: 17 %
MCH RBC QN AUTO: 32 PG (ref 25–35)
MCHC RBC AUTO-ENTMCNC: 31 G/DL (ref 32–37)
MCV RBC AUTO: 105 FL (ref 78–97)
MONOCYTE: 10 %
NEUTROPHIL: 68 %
NRBC BLD MANUAL-RTO: 0 % (ref 0–0.2)
PLATELET: 230 K/CUMM (ref 150–450)
POTASSIUM: 4.2 MEQ/L (ref 3.5–5.1)
PROTHROMBIN TIME: 12 SECONDS (ref 9.7–11.6)
RBC # BLD: 2.81 M/CUMM (ref 3.7–5.2)
RDW: 15.9 % (ref 11.5–14.5)
SODIUM: 136 MEQ/L (ref 136–145)
TCO2: 27 MEQ/L (ref 19–29)
TOTAL PROTEIN: 5.9 G/DL (ref 6.4–8.3)
TROPONIN I: 0.02 NG/ML
WBC # BLD: 6.5 K/CUMM (ref 4–11)

## 2018-04-10 ENCOUNTER — PRIOR ORIGINAL RECORDS (OUTPATIENT)
Dept: OTHER | Age: 73
End: 2018-04-10

## 2018-04-12 ENCOUNTER — PRIOR ORIGINAL RECORDS (OUTPATIENT)
Dept: OTHER | Age: 73
End: 2018-04-12

## 2018-04-17 ENCOUNTER — PRIOR ORIGINAL RECORDS (OUTPATIENT)
Dept: OTHER | Age: 73
End: 2018-04-17

## 2018-04-24 ENCOUNTER — PRIOR ORIGINAL RECORDS (OUTPATIENT)
Dept: OTHER | Age: 73
End: 2018-04-24

## 2018-04-25 ENCOUNTER — HOSPITAL (OUTPATIENT)
Dept: OTHER | Age: 73
End: 2018-04-25
Attending: INTERNAL MEDICINE

## 2018-05-23 ENCOUNTER — HOSPITAL (OUTPATIENT)
Dept: OTHER | Age: 73
End: 2018-05-23
Attending: INTERNAL MEDICINE

## 2018-05-24 ENCOUNTER — HOSPITAL (OUTPATIENT)
Dept: OTHER | Age: 73
End: 2018-05-24
Attending: RADIOLOGY

## 2018-05-24 ENCOUNTER — PRIOR ORIGINAL RECORDS (OUTPATIENT)
Dept: OTHER | Age: 73
End: 2018-05-24

## 2018-06-07 ENCOUNTER — HOSPITAL (OUTPATIENT)
Dept: OTHER | Age: 73
End: 2018-06-07
Attending: SURGERY

## 2018-06-26 ENCOUNTER — HOSPITAL (OUTPATIENT)
Dept: OTHER | Age: 73
End: 2018-06-26
Attending: INTERNAL MEDICINE

## 2018-07-25 ENCOUNTER — HOSPITAL (OUTPATIENT)
Dept: OTHER | Age: 73
End: 2018-07-25
Attending: INTERNAL MEDICINE

## 2018-07-26 ENCOUNTER — HOSPITAL (OUTPATIENT)
Dept: OTHER | Age: 73
End: 2018-07-26
Attending: SURGERY

## 2018-07-30 ENCOUNTER — HOSPITAL (OUTPATIENT)
Dept: OTHER | Age: 73
End: 2018-07-30
Attending: FAMILY MEDICINE

## 2018-07-30 LAB
A/G RATIO_: 0.4
ABS LYMPH: 1.3 K/CUMM (ref 1–3.5)
ABS MONO: 1.2 K/CUMM (ref 0.1–0.8)
ABS NEUTRO: 14 K/CUMM (ref 2–8)
ALBUMIN: 1.7 G/DL (ref 3.5–5)
ALK PHOS: 157 UNIT/L (ref 50–124)
ALT/GPT: 12 UNIT/L (ref 0–55)
ANION GAP SERPL CALC-SCNC: 12 MEQ/L (ref 10–20)
AST/GOT: 27 UNIT/L (ref 5–34)
BASOPHIL: 0 % (ref 0–1)
BILI TOTAL: 0.7 MG/DL (ref 0.2–1)
BUN SERPL-MCNC: 70 MG/DL (ref 6–20)
CALCIUM: 8.4 MG/DL (ref 8.4–10.2)
CHLORIDE: 93 MEQ/L (ref 97–107)
CREATININE: 1.33 MG/DL (ref 0.6–1.3)
DEVICE SN: ABNORMAL
DEVICE SN: ABNORMAL
DIFF_TYPE?: ABNORMAL
EOSINOPHIL: 1 % (ref 0–6)
GLOBULIN_: 4.1 G/DL (ref 2–4.1)
GLUCOSE LVL: 150 MG/DL (ref 70–99)
HCT VFR BLD CALC: 25 % (ref 33–45)
HCT VFR BLD CALC: 26 % (ref 33–45)
HEMOLYSIS 2+: NEGATIVE
HEMOLYSIS 2+: NEGATIVE
HGB BLD-MCNC: 8 G/DL (ref 11–15)
HGB BLD-MCNC: 8.2 G/DL (ref 11–15)
IMMATURE GRAN: 1.8 % (ref 0–0.3)
INR: 1.2 (ref 0.9–1.1)
INSTR WBC: 17 K/CUMM (ref 4–11)
LIPEMIC 3+: NEGATIVE
LYMPHOCYTE: 8 %
MAGNESIUM LEVEL: 2.1 MG/DL (ref 1.6–2.6)
MCH RBC QN AUTO: 31 PG (ref 25–35)
MCHC RBC AUTO-ENTMCNC: 31 G/DL (ref 32–37)
MCV RBC AUTO: 100 FL (ref 78–97)
MONOCYTE: 7 %
NEUTROPHIL: 82 %
NRBC BLD MANUAL-RTO: 0 % (ref 0–0.2)
PLATELET MORPH: ABNORMAL
PLATELET: 313 K/CUMM (ref 150–450)
PLT ESTIMATE: ADEQUATE
POC_GLU: 156 MG/DL (ref 70–99)
POC_GLU: 173 MG/DL (ref 70–99)
POTASSIUM: 4.3 MEQ/L (ref 3.5–5.1)
PROTHROMBIN TIME: 11.9 SECONDS (ref 9.7–11.6)
RBC # BLD: 2.62 M/CUMM (ref 3.7–5.2)
RBC MORPH2: ABNORMAL
RBC MORPH3: ABNORMAL
RBC MORPH: ABNORMAL
RDW: 15.9 % (ref 11.5–14.5)
SODIUM: 130 MEQ/L (ref 136–145)
TCO2: 29 MEQ/L (ref 19–29)
TECH_ID: ABNORMAL
TECH_ID: ABNORMAL
TOTAL PROTEIN: 5.8 G/DL (ref 6.4–8.3)
WBC # BLD: 17 K/CUMM (ref 4–11)

## 2018-07-31 LAB
A/G RATIO_: 0.4
ABS LYMPH: 1 K/CUMM (ref 1–3.5)
ABS MONO: 1 K/CUMM (ref 0.1–0.8)
ABS NEUTRO: 10.9 K/CUMM (ref 2–8)
ALBUMIN: 1.6 G/DL (ref 3.5–5)
ALK PHOS: 132 UNIT/L (ref 50–124)
ALT/GPT: 11 UNIT/L (ref 0–55)
ANION GAP SERPL CALC-SCNC: 12 MEQ/L (ref 10–20)
AST/GOT: 22 UNIT/L (ref 5–34)
BASOPHIL: 0 % (ref 0–1)
BILI TOTAL: 0.6 MG/DL (ref 0.2–1)
BUN SERPL-MCNC: 67 MG/DL (ref 6–20)
CALCIUM: 8.2 MG/DL (ref 8.4–10.2)
CHLORIDE: 96 MEQ/L (ref 97–107)
CREATININE: 0.99 MG/DL (ref 0.6–1.3)
DEVICE SN: ABNORMAL
DIFF_TYPE?: ABNORMAL
EOSINOPHIL: 1 % (ref 0–6)
GLOBULIN_: 3.7 G/DL (ref 2–4.1)
GLUCOSE LVL: 124 MG/DL (ref 70–99)
HCT VFR BLD CALC: 23 % (ref 33–45)
HCT VFR BLD CALC: 23 % (ref 33–45)
HEMOLYSIS 2+: NEGATIVE
HGB BLD-MCNC: 7.4 G/DL (ref 11–15)
HGB BLD-MCNC: 7.5 G/DL (ref 11–15)
IMMATURE GRAN: 1.4 % (ref 0–0.3)
INSTR WBC: 13.3 K/CUMM (ref 4–11)
LIPEMIC 3+: NEGATIVE
LYMPHOCYTE: 8 %
MCH RBC QN AUTO: 32 PG (ref 25–35)
MCHC RBC AUTO-ENTMCNC: 33 G/DL (ref 32–37)
MCV RBC AUTO: 97 FL (ref 78–97)
MONOCYTE: 7 %
NEUTROPHIL: 82 %
NRBC BLD MANUAL-RTO: 0 % (ref 0–0.2)
PLATELET: 282 K/CUMM (ref 150–450)
POC_GLU: 119 MG/DL (ref 70–99)
POC_GLU: 133 MG/DL (ref 70–99)
POC_GLU: 187 MG/DL (ref 70–99)
POC_GLU: 203 MG/DL (ref 70–99)
POTASSIUM: 3.9 MEQ/L (ref 3.5–5.1)
RBC # BLD: 2.32 M/CUMM (ref 3.7–5.2)
RDW: 15.9 % (ref 11.5–14.5)
SODIUM: 134 MEQ/L (ref 136–145)
TCO2: 30 MEQ/L (ref 19–29)
TECH_ID: ABNORMAL
TOTAL PROTEIN: 5.3 G/DL (ref 6.4–8.3)
WBC # BLD: 13.3 K/CUMM (ref 4–11)

## 2018-08-01 LAB
A/G RATIO_: 0.4
ABS LYMPH: 1 K/CUMM (ref 1–3.5)
ABS MONO: 1 K/CUMM (ref 0.1–0.8)
ABS NEUTRO: 9.4 K/CUMM (ref 2–8)
ALBUMIN: 1.6 G/DL (ref 3.5–5)
ALK PHOS: 117 UNIT/L (ref 50–124)
ALT/GPT: 7 UNIT/L (ref 0–55)
ANION GAP SERPL CALC-SCNC: 11 MEQ/L (ref 10–20)
AST/GOT: 20 UNIT/L (ref 5–34)
BASOPHIL: 0 % (ref 0–1)
BILI TOTAL: 0.4 MG/DL (ref 0.2–1)
BUN SERPL-MCNC: 53 MG/DL (ref 6–20)
CALCIUM: 8.4 MG/DL (ref 8.4–10.2)
CHLORIDE: 101 MEQ/L (ref 97–107)
CREATININE: 0.8 MG/DL (ref 0.6–1.3)
DEVICE SN: ABNORMAL
DIFF_TYPE?: ABNORMAL
EOSINOPHIL: 2 % (ref 0–6)
GLOBULIN_: 3.7 G/DL (ref 2–4.1)
GLUCOSE LVL: 112 MG/DL (ref 70–99)
HCT VFR BLD CALC: 23 % (ref 33–45)
HEMOLYSIS 2+: NEGATIVE
HGB BLD-MCNC: 7.2 G/DL (ref 11–15)
IMMATURE GRAN: 1.7 % (ref 0–0.3)
INSTR WBC: 11.8 K/CUMM (ref 4–11)
LIPEMIC 3+: NEGATIVE
LYMPHOCYTE: 9 %
MCH RBC QN AUTO: 31 PG (ref 25–35)
MCHC RBC AUTO-ENTMCNC: 32 G/DL (ref 32–37)
MCV RBC AUTO: 98 FL (ref 78–97)
MONOCYTE: 8 %
NEUTROPHIL: 80 %
NRBC BLD MANUAL-RTO: 0 % (ref 0–0.2)
PLATELET: 285 K/CUMM (ref 150–450)
POC_GLU: 110 MG/DL (ref 70–99)
POC_GLU: 218 MG/DL (ref 70–99)
POC_GLU: 253 MG/DL (ref 70–99)
POC_GLU: 289 MG/DL (ref 70–99)
POTASSIUM: 3.6 MEQ/L (ref 3.5–5.1)
RBC # BLD: 2.32 M/CUMM (ref 3.7–5.2)
RDW: 16.2 % (ref 11.5–14.5)
SODIUM: 138 MEQ/L (ref 136–145)
TCO2: 30 MEQ/L (ref 19–29)
TECH_ID: ABNORMAL
TOTAL PROTEIN: 5.3 G/DL (ref 6.4–8.3)
WBC # BLD: 11.8 K/CUMM (ref 4–11)

## 2018-08-02 LAB
DEVICE SN: ABNORMAL
POC_GLU: 107 MG/DL (ref 70–99)
POC_GLU: 160 MG/DL (ref 70–99)
POC_GLU: 219 MG/DL (ref 70–99)
POC_GLU: 304 MG/DL (ref 70–99)
TECH_ID: ABNORMAL

## 2018-08-03 LAB
ABS LYMPH: 1.1 K/CUMM (ref 1–3.5)
ABS MONO: 1 K/CUMM (ref 0.1–0.8)
ABS NEUTRO: 12.6 K/CUMM (ref 2–8)
APTT PPP: 29 SECONDS (ref 22–30)
BASOPHIL: 0 % (ref 0–1)
DEVICE SN: ABNORMAL
DEVICE SN: NORMAL
DIFF_TYPE?: ABNORMAL
EOSINOPHIL: 2 % (ref 0–6)
HCT VFR BLD CALC: 22 % (ref 33–45)
HGB BLD-MCNC: 6.9 G/DL (ref 11–15)
IMMATURE GRAN: 2.1 % (ref 0–0.3)
INR: 1.1 (ref 0.9–1.1)
INSTR WBC: 15.5 K/CUMM (ref 4–11)
LYMPHOCYTE: 7 %
MCH RBC QN AUTO: 32 PG (ref 25–35)
MCHC RBC AUTO-ENTMCNC: 31 G/DL (ref 32–37)
MCV RBC AUTO: 104 FL (ref 78–97)
MONOCYTE: 7 %
NEUTROPHIL: 82 %
NRBC BLD MANUAL-RTO: 0 % (ref 0–0.2)
PLATELET: 270 K/CUMM (ref 150–450)
PLT ESTIMATE: ADEQUATE
POC_GLU: 111 MG/DL (ref 70–99)
POC_GLU: 165 MG/DL (ref 70–99)
POC_GLU: 202 MG/DL (ref 70–99)
POC_GLU: 99 MG/DL (ref 70–99)
PROTHROMBIN TIME: 11.1 SECONDS (ref 9.7–11.6)
RBC # BLD: 2.16 M/CUMM (ref 3.7–5.2)
RBC MORPH2: ABNORMAL
RBC MORPH3: ABNORMAL
RBC MORPH4: ABNORMAL
RBC MORPH: ABNORMAL
RDW: 16.7 % (ref 11.5–14.5)
TECH_ID: ABNORMAL
TECH_ID: NORMAL
WBC # BLD: 15.5 K/CUMM (ref 4–11)

## 2018-08-04 LAB
ABS LYMPH: 1.2 K/CUMM (ref 1–3.5)
ABS MONO: 1.1 K/CUMM (ref 0.1–0.8)
ABS NEUTRO: 14.3 K/CUMM (ref 2–8)
ANION GAP SERPL CALC-SCNC: 10 MEQ/L (ref 10–20)
BASOPHIL: 0 % (ref 0–1)
BUN SERPL-MCNC: 27 MG/DL (ref 6–20)
CALCIUM: 8.1 MG/DL (ref 8.4–10.2)
CHLORIDE: 105 MEQ/L (ref 97–107)
CREATININE: 0.68 MG/DL (ref 0.6–1.3)
DEVICE SN: ABNORMAL
DEVICE SN: NORMAL
DIFF_TYPE?: ABNORMAL
EOSINOPHIL: 1 % (ref 0–6)
GLUCOSE LVL: 105 MG/DL (ref 70–99)
HCT VFR BLD CALC: 26 % (ref 33–45)
HEMOLYSIS 2+: NEGATIVE
HGB BLD-MCNC: 8.1 G/DL (ref 11–15)
IMMATURE GRAN: 1.7 % (ref 0–0.3)
INSTR WBC: 17.2 K/CUMM (ref 4–11)
LYMPHOCYTE: 7 %
MCH RBC QN AUTO: 32 PG (ref 25–35)
MCHC RBC AUTO-ENTMCNC: 31 G/DL (ref 32–37)
MCV RBC AUTO: 100 FL (ref 78–97)
MONOCYTE: 6 %
NEUTROPHIL: 84 %
NRBC BLD MANUAL-RTO: 0 % (ref 0–0.2)
PLATELET: 271 K/CUMM (ref 150–450)
POC_GLU: 128 MG/DL (ref 70–99)
POC_GLU: 176 MG/DL (ref 70–99)
POC_GLU: 183 MG/DL (ref 70–99)
POC_GLU: 84 MG/DL (ref 70–99)
POTASSIUM: 4.7 MEQ/L (ref 3.5–5.1)
RBC # BLD: 2.57 M/CUMM (ref 3.7–5.2)
RDW: 16.5 % (ref 11.5–14.5)
SODIUM: 136 MEQ/L (ref 136–145)
TCO2: 26 MEQ/L (ref 19–29)
TECH_ID: ABNORMAL
TECH_ID: NORMAL
WBC # BLD: 17.2 K/CUMM (ref 4–11)

## 2018-08-05 LAB
ABS LYMPH: 1 K/CUMM (ref 1–3.5)
ABS MONO: 0.9 K/CUMM (ref 0.1–0.8)
ABS NEUTRO: 10.7 K/CUMM (ref 2–8)
BASOPHIL: 0 % (ref 0–1)
DEVICE SN: ABNORMAL
DEVICE SN: NORMAL
DIFF_TYPE?: ABNORMAL
EOSINOPHIL: 2 % (ref 0–6)
HCT VFR BLD CALC: 24 % (ref 33–45)
HGB BLD-MCNC: 7.6 G/DL (ref 11–15)
IMMATURE GRAN: 2.9 % (ref 0–0.3)
INSTR WBC: 13.3 K/CUMM (ref 4–11)
LYMPHOCYTE: 8 %
MCH RBC QN AUTO: 32 PG (ref 25–35)
MCHC RBC AUTO-ENTMCNC: 31 G/DL (ref 32–37)
MCV RBC AUTO: 101 FL (ref 78–97)
MONOCYTE: 7 %
NEUTROPHIL: 80 %
NRBC BLD MANUAL-RTO: 0 % (ref 0–0.2)
PLATELET: 254 K/CUMM (ref 150–450)
POC_GLU: 106 MG/DL (ref 70–99)
POC_GLU: 146 MG/DL (ref 70–99)
POC_GLU: 171 MG/DL (ref 70–99)
POC_GLU: 90 MG/DL (ref 70–99)
RBC # BLD: 2.41 M/CUMM (ref 3.7–5.2)
RDW: 16.5 % (ref 11.5–14.5)
TECH_ID: ABNORMAL
TECH_ID: NORMAL
WBC # BLD: 13.3 K/CUMM (ref 4–11)

## 2018-08-06 LAB
ABS LYMPH: 0.9 K/CUMM (ref 1–3.5)
ABS MONO: 1.1 K/CUMM (ref 0.1–0.8)
ABS NEUTRO: 11.3 K/CUMM (ref 2–8)
BASOPHIL: 0 % (ref 0–1)
DEVICE SN: ABNORMAL
DEVICE SN: NORMAL
DIFF_TYPE?: ABNORMAL
EOSINOPHIL: 2 % (ref 0–6)
HCT VFR BLD CALC: 25 % (ref 33–45)
HGB BLD-MCNC: 7.8 G/DL (ref 11–15)
IMMATURE GRAN: 1.9 % (ref 0–0.3)
INSTR WBC: 13.8 K/CUMM (ref 4–11)
LYMPHOCYTE: 6 %
MCH RBC QN AUTO: 32 PG (ref 25–35)
MCHC RBC AUTO-ENTMCNC: 31 G/DL (ref 32–37)
MCV RBC AUTO: 102 FL (ref 78–97)
MONOCYTE: 8 %
NEUTROPHIL: 82 %
NRBC BLD MANUAL-RTO: 0 % (ref 0–0.2)
PLATELET: 261 K/CUMM (ref 150–450)
POC_GLU: 112 MG/DL (ref 70–99)
POC_GLU: 126 MG/DL (ref 70–99)
POC_GLU: 131 MG/DL (ref 70–99)
POC_GLU: 64 MG/DL (ref 70–99)
POC_GLU: 82 MG/DL (ref 70–99)
RBC # BLD: 2.47 M/CUMM (ref 3.7–5.2)
RDW: 16.4 % (ref 11.5–14.5)
TECH_ID: ABNORMAL
TECH_ID: NORMAL
WBC # BLD: 13.8 K/CUMM (ref 4–11)

## 2018-08-07 LAB
DEVICE SN: ABNORMAL
DEVICE SN: NORMAL
POC_GLU: 129 MG/DL (ref 70–99)
POC_GLU: 161 MG/DL (ref 70–99)
POC_GLU: 167 MG/DL (ref 70–99)
POC_GLU: 74 MG/DL (ref 70–99)
TECH_ID: ABNORMAL
TECH_ID: NORMAL

## 2018-08-08 LAB
DEVICE SN: ABNORMAL
DEVICE SN: NORMAL
POC_GLU: 135 MG/DL (ref 70–99)
POC_GLU: 89 MG/DL (ref 70–99)
TECH_ID: ABNORMAL
TECH_ID: NORMAL

## 2018-08-09 ENCOUNTER — HOSPITAL (OUTPATIENT)
Dept: OTHER | Age: 73
End: 2018-08-09
Attending: FAMILY MEDICINE

## 2018-08-11 LAB
ABS LYMPH: 1.2 K/CUMM (ref 1–3.5)
ABS MONO: 1.2 K/CUMM (ref 0.1–0.8)
ABS NEUTRO: 12.2 K/CUMM (ref 2–8)
BASOPHIL: 0 % (ref 0–1)
DIFF_TYPE?: ABNORMAL
EOSINOPHIL: 2 % (ref 0–6)
HCT VFR BLD CALC: 25 % (ref 33–45)
HGB BLD-MCNC: 7.6 G/DL (ref 11–15)
IMMATURE GRAN: 0.9 % (ref 0–0.3)
INSTR WBC: 15 K/CUMM (ref 4–11)
LYMPHOCYTE: 8 %
MCH RBC QN AUTO: 31 PG (ref 25–35)
MCHC RBC AUTO-ENTMCNC: 31 G/DL (ref 32–37)
MCV RBC AUTO: 102 FL (ref 78–97)
MONOCYTE: 8 %
NEUTROPHIL: 81 %
NRBC BLD MANUAL-RTO: 0 % (ref 0–0.2)
PLATELET: 288 K/CUMM (ref 150–450)
RBC # BLD: 2.42 M/CUMM (ref 3.7–5.2)
RDW: 15.9 % (ref 11.5–14.5)
WBC # BLD: 15 K/CUMM (ref 4–11)

## 2018-08-29 ENCOUNTER — HOSPITAL (OUTPATIENT)
Dept: OTHER | Age: 73
End: 2018-08-29
Attending: INTERNAL MEDICINE

## 2019-08-10 ENCOUNTER — HOSPITAL ENCOUNTER (EMERGENCY)
Age: 74
Discharge: HOME | End: 2019-08-11
Attending: STUDENT IN AN ORGANIZED HEALTH CARE EDUCATION/TRAINING PROGRAM

## 2019-08-10 ENCOUNTER — APPOINTMENT (OUTPATIENT)
Dept: INTERPRETER SERVICES | Age: 74
End: 2019-08-10

## 2019-08-10 DIAGNOSIS — R50.9 FEVER, UNSPECIFIED FEVER CAUSE: ICD-10-CM

## 2019-08-10 DIAGNOSIS — N39.0 URINARY TRACT INFECTION WITHOUT HEMATURIA, SITE UNSPECIFIED: Primary | ICD-10-CM

## 2019-08-10 LAB
ANION GAP BLD CALC-SCNC: 15 MMOL/L
BASOPHILS # BLD AUTO: 0.1 K/MCL (ref 0–0.3)
BASOPHILS NFR BLD AUTO: 0 %
BUN BLD-MCNC: 30 MG/DL (ref 6–20)
CA-I BLD ISE-SCNC: 1.15 MMOL/L (ref 1.15–1.29)
CHLORIDE BLD-SCNC: 103 MMOL/L (ref 98–107)
CO2 BLD-SCNC: 25 MMOL/L (ref 19–24)
CREAT BLD-MCNC: 1.4 MG/DL (ref 0.51–0.95)
CREAT BLD-MCNC: 42 MG/DL
DIFFERENTIAL METHOD BLD: ABNORMAL
EOSINOPHIL # BLD AUTO: 0.1 K/MCL (ref 0.1–0.5)
EOSINOPHIL NFR SPEC: 0 %
ERYTHROCYTE [DISTWIDTH] IN BLOOD: 13.8 % (ref 11–15)
GLUCOSE BLD-MCNC: 162 MG/DL (ref 65–99)
HCT VFR BLD CALC: 34.8 % (ref 36–46.5)
HCT VFR BLD CALC: 36 % (ref 36–46.5)
HGB BLD-MCNC: 11.3 G/DL (ref 12–15.5)
HGB BLD-MCNC: 12.2 G/DL (ref 12–15.5)
IMM GRANULOCYTES # BLD AUTO: 0.1 K/MCL (ref 0–0.2)
IMM GRANULOCYTES NFR BLD: 0 %
LACTATE BLD-MCNC: 1.4 MMOL/L
LYMPHOCYTES # BLD MANUAL: 0.7 K/MCL (ref 1–4)
LYMPHOCYTES NFR BLD MANUAL: 5 %
MCH RBC QN AUTO: 30.7 PG (ref 26–34)
MCHC RBC AUTO-ENTMCNC: 32.5 G/DL (ref 32–36.5)
MCV RBC AUTO: 94.6 FL (ref 78–100)
MONOCYTES # BLD MANUAL: 0.9 K/MCL (ref 0.3–0.9)
MONOCYTES NFR BLD MANUAL: 7 %
NEUTROPHILS # BLD: 11.8 K/MCL (ref 1.8–7.7)
NEUTROPHILS NFR BLD AUTO: 88 %
NRBC BLD MANUAL-RTO: 0 /100 WBC
PLATELET # BLD: 185 K/MCL (ref 140–450)
POTASSIUM BLD-SCNC: 3.6 MMOL/L (ref 3.4–5.1)
RBC # BLD: 3.68 MIL/MCL (ref 4–5.2)
SODIUM BLD-SCNC: 139 MMOL/L (ref 135–145)
TROPONIN I BLD-MCNC: <0.1 NG/ML
WBC # BLD: 13.5 K/MCL (ref 4.2–11)

## 2019-08-10 PROCEDURE — 80053 COMPREHEN METABOLIC PANEL: CPT

## 2019-08-10 PROCEDURE — 85025 COMPLETE CBC W/AUTO DIFF WBC: CPT

## 2019-08-10 PROCEDURE — 36415 COLL VENOUS BLD VENIPUNCTURE: CPT

## 2019-08-10 PROCEDURE — 83735 ASSAY OF MAGNESIUM: CPT

## 2019-08-10 PROCEDURE — 96361 HYDRATE IV INFUSION ADD-ON: CPT

## 2019-08-10 PROCEDURE — 85730 THROMBOPLASTIN TIME PARTIAL: CPT

## 2019-08-10 PROCEDURE — 80047 BASIC METABLC PNL IONIZED CA: CPT

## 2019-08-10 PROCEDURE — 99285 EMERGENCY DEPT VISIT HI MDM: CPT

## 2019-08-10 PROCEDURE — 84484 ASSAY OF TROPONIN QUANT: CPT

## 2019-08-10 PROCEDURE — 83605 ASSAY OF LACTIC ACID: CPT

## 2019-08-10 PROCEDURE — 96374 THER/PROPH/DIAG INJ IV PUSH: CPT

## 2019-08-10 PROCEDURE — 93005 ELECTROCARDIOGRAM TRACING: CPT | Performed by: STUDENT IN AN ORGANIZED HEALTH CARE EDUCATION/TRAINING PROGRAM

## 2019-08-10 RX ORDER — 0.9 % SODIUM CHLORIDE 0.9 %
2 VIAL (ML) INJECTION PRN
Status: DISCONTINUED | OUTPATIENT
Start: 2019-08-10 | End: 2019-08-11 | Stop reason: HOSPADM

## 2019-08-10 RX ORDER — 0.9 % SODIUM CHLORIDE 0.9 %
2 VIAL (ML) INJECTION EVERY 12 HOURS SCHEDULED
Status: DISCONTINUED | OUTPATIENT
Start: 2019-08-11 | End: 2019-08-11 | Stop reason: HOSPADM

## 2019-08-10 ASSESSMENT — ENCOUNTER SYMPTOMS
CHILLS: 1
TREMORS: 1
NAUSEA: 0
ABDOMINAL PAIN: 0
SHORTNESS OF BREATH: 1
RHINORRHEA: 0
HEADACHES: 0
VOMITING: 0
COUGH: 0
SORE THROAT: 0

## 2019-08-10 ASSESSMENT — PAIN SCALES - GENERAL: PAINLEVEL_OUTOF10: 0

## 2019-08-11 ENCOUNTER — APPOINTMENT (OUTPATIENT)
Dept: GENERAL RADIOLOGY | Age: 74
End: 2019-08-11
Attending: STUDENT IN AN ORGANIZED HEALTH CARE EDUCATION/TRAINING PROGRAM

## 2019-08-11 ENCOUNTER — APPOINTMENT (OUTPATIENT)
Dept: INTERPRETER SERVICES | Age: 74
End: 2019-08-11

## 2019-08-11 VITALS
HEIGHT: 60 IN | WEIGHT: 166.23 LBS | HEART RATE: 92 BPM | OXYGEN SATURATION: 93 % | BODY MASS INDEX: 32.64 KG/M2 | DIASTOLIC BLOOD PRESSURE: 62 MMHG | SYSTOLIC BLOOD PRESSURE: 140 MMHG | RESPIRATION RATE: 18 BRPM | TEMPERATURE: 103 F

## 2019-08-11 LAB
ALBUMIN SERPL-MCNC: 4.1 G/DL (ref 3.6–5.1)
ALBUMIN/GLOB SERPL: 1.1 {RATIO} (ref 1–2.4)
ALP SERPL-CCNC: 94 UNITS/L (ref 45–117)
ALT SERPL-CCNC: 22 UNITS/L
ANION GAP SERPL CALC-SCNC: 13 MMOL/L (ref 10–20)
APPEARANCE UR: ABNORMAL
APTT PPP: 27 SEC (ref 22–32)
AST SERPL-CCNC: 26 UNITS/L
ATRIAL RATE (BPM): 96
BACTERIA #/AREA URNS HPF: ABNORMAL /HPF
BILIRUB SERPL-MCNC: 0.5 MG/DL (ref 0.2–1)
BILIRUB UR QL STRIP: NEGATIVE
BUN SERPL-MCNC: 32 MG/DL (ref 6–20)
BUN/CREAT SERPL: 27 (ref 7–25)
CALCIUM SERPL-MCNC: 8.6 MG/DL (ref 8.4–10.2)
CHLORIDE SERPL-SCNC: 106 MMOL/L (ref 98–107)
CO2 SERPL-SCNC: 26 MMOL/L (ref 21–32)
COLOR UR: YELLOW
CREAT SERPL-MCNC: 1.19 MG/DL (ref 0.51–0.95)
DIASTOLIC BLOOD PRESSURE: 71
GLOBULIN SER-MCNC: 3.8 G/DL (ref 2–4)
GLUCOSE SERPL-MCNC: 164 MG/DL (ref 65–99)
GLUCOSE UR STRIP-MCNC: NEGATIVE MG/DL
HGB UR QL STRIP: NEGATIVE
HYALINE CASTS #/AREA URNS LPF: ABNORMAL /LPF (ref 0–5)
KETONES UR STRIP-MCNC: NEGATIVE MG/DL
LEUKOCYTE ESTERASE UR QL STRIP: ABNORMAL
MAGNESIUM SERPL-MCNC: 1.9 MG/DL (ref 1.7–2.4)
NITRITE UR QL STRIP: POSITIVE
P AXIS (DEGREES): 68
PH UR STRIP: 7 UNITS (ref 5–7)
POTASSIUM SERPL-SCNC: 3.7 MMOL/L (ref 3.4–5.1)
PR-INTERVAL (MSEC): 186
PROT SERPL-MCNC: 7.9 G/DL (ref 6.4–8.2)
PROT UR STRIP-MCNC: NEGATIVE MG/DL
QRS-INTERVAL (MSEC): 78
QT-INTERVAL (MSEC): 366
QTC: 462
R AXIS (DEGREES): 81
RBC #/AREA URNS HPF: ABNORMAL /HPF (ref 0–2)
REPORT TEXT: NORMAL
SODIUM SERPL-SCNC: 141 MMOL/L (ref 135–145)
SP GR UR STRIP: 1.01 (ref 1–1.03)
SPECIMEN SOURCE: ABNORMAL
SQUAMOUS #/AREA URNS HPF: ABNORMAL /HPF (ref 0–5)
SYSTOLIC BLOOD PRESSURE: 159
T AXIS (DEGREES): 116
UROBILINOGEN UR STRIP-MCNC: 0.2 MG/DL (ref 0–1)
VENTRICULAR RATE EKG/MIN (BPM): 96
WBC #/AREA URNS HPF: ABNORMAL /HPF (ref 0–5)

## 2019-08-11 PROCEDURE — 10002807 HB RX 258: Performed by: STUDENT IN AN ORGANIZED HEALTH CARE EDUCATION/TRAINING PROGRAM

## 2019-08-11 PROCEDURE — 87086 URINE CULTURE/COLONY COUNT: CPT

## 2019-08-11 PROCEDURE — 71045 X-RAY EXAM CHEST 1 VIEW: CPT | Performed by: RADIOLOGY

## 2019-08-11 PROCEDURE — 87186 SC STD MICRODIL/AGAR DIL: CPT

## 2019-08-11 PROCEDURE — 71045 X-RAY EXAM CHEST 1 VIEW: CPT

## 2019-08-11 PROCEDURE — 87088 URINE BACTERIA CULTURE: CPT

## 2019-08-11 PROCEDURE — 87040 BLOOD CULTURE FOR BACTERIA: CPT

## 2019-08-11 PROCEDURE — 81001 URINALYSIS AUTO W/SCOPE: CPT

## 2019-08-11 PROCEDURE — 36415 COLL VENOUS BLD VENIPUNCTURE: CPT

## 2019-08-11 RX ORDER — CEFTRIAXONE 1 G/1
INJECTION, POWDER, FOR SOLUTION INTRAMUSCULAR; INTRAVENOUS
Status: DISCONTINUED
Start: 2019-08-11 | End: 2019-08-11 | Stop reason: HOSPADM

## 2019-08-11 RX ORDER — ACETAMINOPHEN 500 MG
TABLET ORAL
Status: DISCONTINUED
Start: 2019-08-11 | End: 2019-08-11 | Stop reason: HOSPADM

## 2019-08-11 RX ADMIN — SODIUM CHLORIDE 1000 ML: 9 INJECTION, SOLUTION INTRAVENOUS at 00:02

## 2019-08-12 LAB
BACTERIA UR CULT: ABNORMAL
ORGANISM: ABNORMAL
REPORT STATUS (RPT): ABNORMAL
SPECIMEN SOURCE: ABNORMAL

## 2019-08-16 LAB
BACTERIA BLD CULT: NORMAL
BACTERIA BLD CULT: NORMAL
REPORT STATUS (RPT): NORMAL
REPORT STATUS (RPT): NORMAL
SPECIMEN SOURCE: NORMAL
SPECIMEN SOURCE: NORMAL

## 2019-10-07 ENCOUNTER — TELEPHONE (OUTPATIENT)
Dept: CARDIOLOGY | Age: 74
End: 2019-10-07

## 2019-10-07 ENCOUNTER — HOSPITAL ENCOUNTER (OUTPATIENT)
Dept: LAB | Age: 74
Discharge: HOME | End: 2019-10-07
Attending: RADIOLOGY

## 2019-10-07 DIAGNOSIS — I71.03 DISSECTION OF THORACOABDOMINAL AORTA (CMD): ICD-10-CM

## 2019-10-07 LAB — CREAT SERPL-MCNC: 0.92 MG/DL (ref 0.51–0.95)

## 2019-10-07 PROCEDURE — 36415 COLL VENOUS BLD VENIPUNCTURE: CPT

## 2019-10-07 PROCEDURE — 82565 ASSAY OF CREATININE: CPT

## 2019-10-14 ENCOUNTER — OFFICE VISIT (OUTPATIENT)
Dept: CARDIOLOGY | Age: 74
End: 2019-10-14

## 2019-10-14 ENCOUNTER — IMAGING SERVICES (OUTPATIENT)
Dept: CT IMAGING | Age: 74
End: 2019-10-14
Attending: RADIOLOGY

## 2019-10-14 VITALS
DIASTOLIC BLOOD PRESSURE: 74 MMHG | WEIGHT: 159.4 LBS | BODY MASS INDEX: 31.29 KG/M2 | HEART RATE: 59 BPM | SYSTOLIC BLOOD PRESSURE: 138 MMHG | OXYGEN SATURATION: 94 % | HEIGHT: 60 IN

## 2019-10-14 VITALS — DIASTOLIC BLOOD PRESSURE: 75 MMHG | OXYGEN SATURATION: 96 % | HEART RATE: 66 BPM | SYSTOLIC BLOOD PRESSURE: 162 MMHG

## 2019-10-14 DIAGNOSIS — I71.03 DISSECTION OF THORACOABDOMINAL AORTA (CMD): ICD-10-CM

## 2019-10-14 DIAGNOSIS — Q25.1 COARCTATION OF AORTA: Primary | ICD-10-CM

## 2019-10-14 PROCEDURE — 99213 OFFICE O/P EST LOW 20 MIN: CPT | Performed by: RADIOLOGY

## 2019-10-14 PROCEDURE — 71275 CT ANGIOGRAPHY CHEST: CPT | Performed by: INTERNAL MEDICINE

## 2019-10-14 PROCEDURE — 74174 CTA ABD&PLVS W/CONTRAST: CPT | Performed by: INTERNAL MEDICINE

## 2020-01-01 ENCOUNTER — EXTERNAL RECORD (OUTPATIENT)
Dept: OTHER | Age: 75
End: 2020-01-01

## 2020-03-12 ENCOUNTER — OFFICE VISIT (OUTPATIENT)
Dept: ORTHOPEDICS | Age: 75
End: 2020-03-12

## 2020-03-12 VITALS — BODY MASS INDEX: 31.41 KG/M2 | HEIGHT: 60 IN | WEIGHT: 160 LBS | RESPIRATION RATE: 16 BRPM

## 2020-03-12 DIAGNOSIS — M25.552 LEFT HIP PAIN: ICD-10-CM

## 2020-03-12 DIAGNOSIS — M70.62 TROCHANTERIC BURSITIS OF LEFT HIP: Primary | ICD-10-CM

## 2020-03-12 DIAGNOSIS — M48.061 SPINAL STENOSIS OF LUMBAR REGION, UNSPECIFIED WHETHER NEUROGENIC CLAUDICATION PRESENT: ICD-10-CM

## 2020-03-12 PROCEDURE — 20610 DRAIN/INJ JOINT/BURSA W/O US: CPT | Performed by: ORTHOPAEDIC SURGERY

## 2020-03-12 PROCEDURE — 99204 OFFICE O/P NEW MOD 45 MIN: CPT | Performed by: ORTHOPAEDIC SURGERY

## 2020-03-12 RX ADMIN — TRIAMCINOLONE ACETONIDE 40 MG: 40 INJECTION, SUSPENSION INTRA-ARTICULAR; INTRAMUSCULAR at 14:42

## 2020-03-24 RX ORDER — TRIAMCINOLONE ACETONIDE 40 MG/ML
40 INJECTION, SUSPENSION INTRA-ARTICULAR; INTRAMUSCULAR ONCE
Status: COMPLETED | OUTPATIENT
Start: 2020-03-12 | End: 2020-03-12

## 2020-04-01 ENCOUNTER — TELEPHONE (OUTPATIENT)
Dept: ORTHOPEDICS | Age: 75
End: 2020-04-01

## 2020-04-01 DIAGNOSIS — M48.061 SPINAL STENOSIS OF LUMBAR REGION, UNSPECIFIED WHETHER NEUROGENIC CLAUDICATION PRESENT: Primary | ICD-10-CM

## 2020-04-01 DIAGNOSIS — M54.42 LEFT-SIDED LOW BACK PAIN WITH LEFT-SIDED SCIATICA, UNSPECIFIED CHRONICITY: ICD-10-CM

## 2020-04-07 ENCOUNTER — TELEPHONE (OUTPATIENT)
Dept: ORTHOPEDICS | Age: 75
End: 2020-04-07

## 2020-06-04 ENCOUNTER — APPOINTMENT (OUTPATIENT)
Dept: INTERPRETER SERVICES | Age: 75
End: 2020-06-04

## 2020-06-04 ENCOUNTER — OFFICE VISIT (OUTPATIENT)
Dept: ORTHOPEDICS | Age: 75
End: 2020-06-04
Attending: ORTHOPAEDIC SURGERY

## 2020-06-04 DIAGNOSIS — M54.16 LUMBAR RADICULOPATHY: Primary | ICD-10-CM

## 2020-06-04 DIAGNOSIS — M43.16 SPONDYLOLISTHESIS AT L4-L5 LEVEL: ICD-10-CM

## 2020-06-04 PROCEDURE — 99215 OFFICE O/P EST HI 40 MIN: CPT | Performed by: ORTHOPAEDIC SURGERY

## 2020-06-04 RX ORDER — ALLOPURINOL 100 MG/1
100 TABLET ORAL DAILY
COMMUNITY

## 2020-06-04 RX ORDER — PREGABALIN 75 MG/1
75 CAPSULE ORAL 2 TIMES DAILY
COMMUNITY

## 2020-06-04 ASSESSMENT — ENCOUNTER SYMPTOMS
EYE PAIN: 0
SPEECH DIFFICULTY: 0
FEVER: 0
BACK PAIN: 1
ADENOPATHY: 0
FACIAL ASYMMETRY: 0
WOUND: 0
SHORTNESS OF BREATH: 0
ACTIVITY CHANGE: 1
ABDOMINAL PAIN: 0
COUGH: 0
FATIGUE: 0

## 2020-06-16 ENCOUNTER — HOSPITAL ENCOUNTER (OUTPATIENT)
Dept: MRI IMAGING | Age: 75
Discharge: HOME | End: 2020-06-16
Attending: ORTHOPAEDIC SURGERY

## 2020-06-16 DIAGNOSIS — M43.16 SPONDYLOLISTHESIS AT L4-L5 LEVEL: ICD-10-CM

## 2020-06-16 DIAGNOSIS — M54.16 LUMBAR RADICULOPATHY: ICD-10-CM

## 2020-06-16 PROCEDURE — 72148 MRI LUMBAR SPINE W/O DYE: CPT

## 2020-06-16 PROCEDURE — 72148 MRI LUMBAR SPINE W/O DYE: CPT | Performed by: RADIOLOGY

## 2020-07-16 ENCOUNTER — OFFICE VISIT (OUTPATIENT)
Dept: ORTHOPEDICS | Age: 75
End: 2020-07-16

## 2020-07-16 DIAGNOSIS — M51.16 LUMBAR DISC HERNIATION WITH RADICULOPATHY: ICD-10-CM

## 2020-07-16 DIAGNOSIS — M43.16 SPONDYLOLISTHESIS AT L4-L5 LEVEL: Primary | ICD-10-CM

## 2020-07-16 PROCEDURE — 99213 OFFICE O/P EST LOW 20 MIN: CPT | Performed by: ORTHOPAEDIC SURGERY

## 2020-07-21 ENCOUNTER — APPOINTMENT (OUTPATIENT)
Dept: INTERPRETER SERVICES | Age: 75
End: 2020-07-21

## 2020-07-21 ENCOUNTER — HOSPITAL ENCOUNTER (OUTPATIENT)
Dept: REHABILITATION | Age: 75
Discharge: STILL ADMITTED OR ADMINISTRATIVE | End: 2020-07-21
Attending: ORTHOPAEDIC SURGERY

## 2020-07-21 DIAGNOSIS — M43.16 SPONDYLOLISTHESIS AT L4-L5 LEVEL: ICD-10-CM

## 2020-07-21 DIAGNOSIS — M51.16 LUMBAR DISC HERNIATION WITH RADICULOPATHY: ICD-10-CM

## 2020-07-21 PROCEDURE — 97530 THERAPEUTIC ACTIVITIES: CPT | Performed by: PHYSICAL THERAPIST

## 2020-07-21 PROCEDURE — 10004173 HB COUNTER-THERAPY VISIT PT: Performed by: PHYSICAL THERAPIST

## 2020-07-21 PROCEDURE — 97161 PT EVAL LOW COMPLEX 20 MIN: CPT | Performed by: PHYSICAL THERAPIST

## 2020-07-21 ASSESSMENT — ENCOUNTER SYMPTOMS
QUALITY: ACHE
SUBJECTIVE PAIN PROGRESSION: IMPROVED
PAIN FREQUENCY: INTERMITTENT
PAIN LOCATION: LEFT BUTTOCK
PAIN SCALE AT HIGHEST: 5
PAIN SEVERITY NOW: 3
PAIN SCALE AT LOWEST: 1
ALLEVIATING FACTOR: PAIN MEDICATION

## 2020-07-28 ENCOUNTER — HOSPITAL ENCOUNTER (OUTPATIENT)
Dept: REHABILITATION | Age: 75
Discharge: STILL ADMITTED OR ADMINISTRATIVE | End: 2020-07-28
Attending: ORTHOPAEDIC SURGERY

## 2020-07-28 PROCEDURE — 97530 THERAPEUTIC ACTIVITIES: CPT | Performed by: PHYSICAL THERAPIST

## 2020-07-28 PROCEDURE — 97110 THERAPEUTIC EXERCISES: CPT | Performed by: PHYSICAL THERAPIST

## 2020-07-28 PROCEDURE — 10004173 HB COUNTER-THERAPY VISIT PT: Performed by: PHYSICAL THERAPIST

## 2020-07-28 ASSESSMENT — ENCOUNTER SYMPTOMS: PAIN SEVERITY NOW: 3

## 2020-08-04 ENCOUNTER — HOSPITAL ENCOUNTER (OUTPATIENT)
Dept: REHABILITATION | Age: 75
Discharge: STILL ADMITTED OR ADMINISTRATIVE | End: 2020-08-04
Attending: ORTHOPAEDIC SURGERY

## 2020-08-04 PROCEDURE — 10004173 HB COUNTER-THERAPY VISIT PT: Performed by: PHYSICAL THERAPIST

## 2020-08-04 PROCEDURE — 97112 NEUROMUSCULAR REEDUCATION: CPT | Performed by: PHYSICAL THERAPIST

## 2020-08-04 PROCEDURE — 97110 THERAPEUTIC EXERCISES: CPT | Performed by: PHYSICAL THERAPIST

## 2020-08-04 PROCEDURE — 97530 THERAPEUTIC ACTIVITIES: CPT | Performed by: PHYSICAL THERAPIST

## 2020-08-04 ASSESSMENT — ENCOUNTER SYMPTOMS: PAIN SEVERITY NOW: 3

## 2020-08-18 ENCOUNTER — APPOINTMENT (OUTPATIENT)
Dept: INTERPRETER SERVICES | Age: 75
End: 2020-08-18

## 2020-08-18 ENCOUNTER — HOSPITAL ENCOUNTER (OUTPATIENT)
Dept: REHABILITATION | Age: 75
Discharge: STILL ADMITTED OR ADMINISTRATIVE | End: 2020-08-18
Attending: ORTHOPAEDIC SURGERY

## 2020-08-18 PROCEDURE — 97110 THERAPEUTIC EXERCISES: CPT | Performed by: PHYSICAL THERAPIST

## 2020-08-18 PROCEDURE — 10004173 HB COUNTER-THERAPY VISIT PT: Performed by: PHYSICAL THERAPIST

## 2020-08-19 ASSESSMENT — ENCOUNTER SYMPTOMS
SUBJECTIVE PAIN PROGRESSION: IMPROVED
PAIN SEVERITY NOW: 0

## 2020-10-01 ENCOUNTER — HOSPITAL ENCOUNTER (EMERGENCY)
Age: 75
Discharge: HOME | End: 2020-10-01
Attending: EMERGENCY MEDICINE

## 2020-10-01 VITALS
SYSTOLIC BLOOD PRESSURE: 168 MMHG | RESPIRATION RATE: 16 BRPM | HEART RATE: 76 BPM | DIASTOLIC BLOOD PRESSURE: 73 MMHG | OXYGEN SATURATION: 96 % | TEMPERATURE: 98.9 F

## 2020-10-01 DIAGNOSIS — S60.222A TRAUMATIC HEMATOMA OF LEFT HAND, INITIAL ENCOUNTER: Primary | ICD-10-CM

## 2020-10-01 PROCEDURE — 99283 EMERGENCY DEPT VISIT LOW MDM: CPT

## 2020-10-01 ASSESSMENT — ENCOUNTER SYMPTOMS
DIARRHEA: 0
SORE THROAT: 0
BACK PAIN: 0
APPETITE CHANGE: 0
WEAKNESS: 0
SHORTNESS OF BREATH: 0
FATIGUE: 0
CHILLS: 0
HEADACHES: 0
COLOR CHANGE: 1
NUMBNESS: 0
ABDOMINAL PAIN: 0
FEVER: 0
COUGH: 0
CONFUSION: 0
NAUSEA: 0
PHOTOPHOBIA: 0
VOMITING: 0

## 2020-10-01 ASSESSMENT — PAIN SCALES - GENERAL
PAINLEVEL_OUTOF10: 3
PAINLEVEL_OUTOF10: 1

## 2020-10-01 ASSESSMENT — PAIN DESCRIPTION - PAIN TYPE
TYPE: ACUTE PAIN
TYPE: ACUTE PAIN

## 2020-12-02 VITALS
HEART RATE: 82 BPM | SYSTOLIC BLOOD PRESSURE: 122 MMHG | TEMPERATURE: 97.7 F | DIASTOLIC BLOOD PRESSURE: 84 MMHG | OXYGEN SATURATION: 97 % | RESPIRATION RATE: 16 BRPM

## 2020-12-02 VITALS
RESPIRATION RATE: 16 BRPM | OXYGEN SATURATION: 95 % | HEART RATE: 82 BPM | TEMPERATURE: 98 F | SYSTOLIC BLOOD PRESSURE: 121 MMHG | DIASTOLIC BLOOD PRESSURE: 53 MMHG

## 2020-12-02 VITALS
HEART RATE: 80 BPM | TEMPERATURE: 97.9 F | SYSTOLIC BLOOD PRESSURE: 105 MMHG | OXYGEN SATURATION: 97 % | RESPIRATION RATE: 16 BRPM | DIASTOLIC BLOOD PRESSURE: 68 MMHG

## 2020-12-02 VITALS
TEMPERATURE: 98.3 F | SYSTOLIC BLOOD PRESSURE: 131 MMHG | RESPIRATION RATE: 18 BRPM | HEART RATE: 81 BPM | OXYGEN SATURATION: 96 % | DIASTOLIC BLOOD PRESSURE: 87 MMHG

## 2021-02-04 ENCOUNTER — IMMUNIZATION (OUTPATIENT)
Dept: LAB | Age: 76
End: 2021-02-04

## 2021-02-04 DIAGNOSIS — Z23 NEED FOR VACCINATION: Primary | ICD-10-CM

## 2021-02-04 PROCEDURE — 0001A COVID 19 PFIZER-BIONTECH: CPT | Performed by: INTERNAL MEDICINE

## 2021-02-04 PROCEDURE — 91300 COVID 19 PFIZER-BIONTECH: CPT | Performed by: INTERNAL MEDICINE

## 2021-02-25 ENCOUNTER — IMMUNIZATION (OUTPATIENT)
Dept: LAB | Age: 76
End: 2021-02-25
Attending: PREVENTIVE MEDICINE

## 2021-02-25 DIAGNOSIS — Z23 NEED FOR VACCINATION: Primary | ICD-10-CM

## 2021-02-25 PROCEDURE — 0002A COVID 19 PFIZER-BIONTECH: CPT | Performed by: INTERNAL MEDICINE

## 2021-02-25 PROCEDURE — 91300 COVID 19 PFIZER-BIONTECH: CPT | Performed by: INTERNAL MEDICINE

## 2021-12-07 DIAGNOSIS — I71.03 DISSECTION OF THORACOABDOMINAL AORTA (CMD): Primary | ICD-10-CM

## 2022-01-28 ENCOUNTER — IMAGING SERVICES (OUTPATIENT)
Dept: CT IMAGING | Age: 77
End: 2022-01-28
Attending: RADIOLOGY

## 2022-01-28 ENCOUNTER — APPOINTMENT (OUTPATIENT)
Dept: VASCULAR SURGERY | Age: 77
End: 2022-01-28

## 2022-01-28 ENCOUNTER — APPOINTMENT (OUTPATIENT)
Dept: INTERPRETER SERVICES | Age: 77
End: 2022-01-28

## 2022-01-28 DIAGNOSIS — Z01.812 PRE-PROCEDURE LAB EXAM: Primary | ICD-10-CM

## 2022-01-28 DIAGNOSIS — I71.03 DISSECTION OF THORACOABDOMINAL AORTA (CMD): ICD-10-CM

## 2022-01-28 LAB — CREAT BLD-MCNC: 1 MG/DL (ref 0.51–0.95)

## 2022-01-28 PROCEDURE — 82570 ASSAY OF URINE CREATININE: CPT | Performed by: RADIOLOGY

## 2022-01-28 PROCEDURE — 74174 CTA ABD&PLVS W/CONTRAST: CPT | Performed by: INTERNAL MEDICINE

## 2022-01-28 PROCEDURE — 71275 CT ANGIOGRAPHY CHEST: CPT | Performed by: INTERNAL MEDICINE

## 2022-01-28 PROCEDURE — G1004 CDSM NDSC: HCPCS | Performed by: INTERNAL MEDICINE

## 2022-02-04 ENCOUNTER — APPOINTMENT (OUTPATIENT)
Dept: INTERPRETER SERVICES | Age: 77
End: 2022-02-04

## 2022-02-04 ENCOUNTER — TELEPHONE (OUTPATIENT)
Dept: VASCULAR SURGERY | Age: 77
End: 2022-02-04

## 2022-02-04 DIAGNOSIS — I71.03 DISSECTION OF THORACOABDOMINAL AORTA (CMD): Primary | ICD-10-CM

## 2022-11-08 ENCOUNTER — LAB SERVICES (OUTPATIENT)
Dept: LAB | Age: 77
End: 2022-11-08

## 2022-11-08 DIAGNOSIS — I10 HYPERTENSION, UNSPECIFIED TYPE: ICD-10-CM

## 2022-11-08 PROBLEM — E78.2 MIXED HYPERLIPIDEMIA: Status: ACTIVE | Noted: 2022-11-08

## 2022-11-08 PROCEDURE — 80048 BASIC METABOLIC PNL TOTAL CA: CPT | Performed by: CLINICAL MEDICAL LABORATORY

## 2022-11-08 PROCEDURE — 36415 COLL VENOUS BLD VENIPUNCTURE: CPT | Performed by: INTERNAL MEDICINE

## 2022-11-09 LAB
ANION GAP SERPL CALC-SCNC: 12 MMOL/L (ref 7–19)
BUN SERPL-MCNC: 24 MG/DL (ref 6–20)
BUN/CREAT SERPL: 22 (ref 7–25)
CALCIUM SERPL-MCNC: 8.9 MG/DL (ref 8.4–10.2)
CHLORIDE SERPL-SCNC: 107 MMOL/L (ref 97–110)
CO2 SERPL-SCNC: 26 MMOL/L (ref 21–32)
CREAT SERPL-MCNC: 1.11 MG/DL (ref 0.51–0.95)
FASTING DURATION TIME PATIENT: ABNORMAL H
GFR SERPLBLD BASED ON 1.73 SQ M-ARVRAT: 51 ML/MIN
GLUCOSE SERPL-MCNC: 131 MG/DL (ref 70–99)
POTASSIUM SERPL-SCNC: 3.7 MMOL/L (ref 3.4–5.1)
SODIUM SERPL-SCNC: 141 MMOL/L (ref 135–145)

## 2023-01-17 PROBLEM — I73.9 PAD (PERIPHERAL ARTERY DISEASE) (CMD): Status: ACTIVE | Noted: 2023-01-17

## 2023-03-22 DIAGNOSIS — Z87.74 STATUS POST AORTIC COARCTATION STENT PLACEMENT: ICD-10-CM

## 2023-03-22 DIAGNOSIS — Q25.1 COARCTATION OF AORTA: Primary | ICD-10-CM

## 2023-03-22 DIAGNOSIS — Z95.828 STATUS POST AORTIC COARCTATION STENT PLACEMENT: ICD-10-CM

## 2023-07-20 DIAGNOSIS — M71.21 SYNOVIAL CYST OF RIGHT KNEE: ICD-10-CM

## 2023-07-20 DIAGNOSIS — M25.562 BILATERAL KNEE PAIN: Primary | ICD-10-CM

## 2023-07-20 DIAGNOSIS — M51.34 DEGENERATION OF THORACIC INTERVERTEBRAL DISC: ICD-10-CM

## 2023-07-20 DIAGNOSIS — M25.561 BILATERAL KNEE PAIN: Primary | ICD-10-CM

## 2023-08-20 ENCOUNTER — HOSPITAL ENCOUNTER (EMERGENCY)
Age: 78
Discharge: HOME | End: 2023-08-20
Attending: EMERGENCY MEDICINE

## 2023-08-20 ENCOUNTER — APPOINTMENT (OUTPATIENT)
Dept: GENERAL RADIOLOGY | Age: 78
End: 2023-08-20
Attending: EMERGENCY MEDICINE

## 2023-08-20 ENCOUNTER — APPOINTMENT (OUTPATIENT)
Dept: CT IMAGING | Age: 78
End: 2023-08-20
Attending: EMERGENCY MEDICINE

## 2023-08-20 VITALS
OXYGEN SATURATION: 94 % | SYSTOLIC BLOOD PRESSURE: 179 MMHG | RESPIRATION RATE: 16 BRPM | HEART RATE: 56 BPM | DIASTOLIC BLOOD PRESSURE: 77 MMHG | TEMPERATURE: 98.3 F

## 2023-08-20 VITALS
RESPIRATION RATE: 18 BRPM | OXYGEN SATURATION: 93 % | HEART RATE: 70 BPM | SYSTOLIC BLOOD PRESSURE: 200 MMHG | TEMPERATURE: 97.6 F | DIASTOLIC BLOOD PRESSURE: 104 MMHG

## 2023-08-20 DIAGNOSIS — N39.0 URINARY TRACT INFECTION WITHOUT HEMATURIA, SITE UNSPECIFIED: Primary | ICD-10-CM

## 2023-08-20 DIAGNOSIS — S81.812A LACERATION OF LEFT LOWER EXTREMITY, INITIAL ENCOUNTER: Primary | ICD-10-CM

## 2023-08-20 LAB
ALBUMIN SERPL-MCNC: 3.7 G/DL (ref 3.6–5.1)
ALBUMIN/GLOB SERPL: 0.9 {RATIO} (ref 1–2.4)
ALP SERPL-CCNC: 101 UNITS/L (ref 45–117)
ALT SERPL-CCNC: 25 UNITS/L
ANION GAP BLD CALC-SCNC: 15 MMOL/L (ref 7–19)
ANION GAP SERPL CALC-SCNC: 7 MMOL/L (ref 7–19)
APPEARANCE UR: ABNORMAL
AST SERPL-CCNC: 13 UNITS/L
ATRIAL RATE (BPM): 54
BACTERIA #/AREA URNS HPF: ABNORMAL /HPF
BASOPHILS # BLD: 0.1 K/MCL (ref 0–0.3)
BASOPHILS NFR BLD: 1 %
BILIRUB SERPL-MCNC: 0.4 MG/DL (ref 0.2–1)
BILIRUB UR QL STRIP: NEGATIVE
BUN BLD-MCNC: 28 MG/DL (ref 6–20)
BUN SERPL-MCNC: 25 MG/DL (ref 6–20)
BUN/CREAT SERPL: 24 (ref 7–25)
CA-I BLD-SCNC: 1.14 MMOL/L (ref 1.15–1.29)
CALCIUM SERPL-MCNC: 8.9 MG/DL (ref 8.4–10.2)
CHLORIDE BLD-SCNC: 105 MMOL/L (ref 97–110)
CHLORIDE SERPL-SCNC: 107 MMOL/L (ref 97–110)
CO2 BLD-SCNC: 25 MMOL/L (ref 19–24)
CO2 SERPL-SCNC: 29 MMOL/L (ref 21–32)
COLOR UR: YELLOW
CREAT SERPL-MCNC: 1.04 MG/DL (ref 0.51–0.95)
CREAT SERPL-MCNC: 1.1 MG/DL (ref 0.51–0.95)
DEPRECATED RDW RBC: 50.6 FL (ref 39–50)
DIASTOLIC BLOOD PRESSURE: 77
EGFRCR SERPLBLD CKD-EPI 2021: 51 ML/MIN/{1.73_M2}
EGFRCR SERPLBLD CKD-EPI 2021: 55 ML/MIN/{1.73_M2}
EOSINOPHIL # BLD: 0.2 K/MCL (ref 0–0.5)
EOSINOPHIL NFR BLD: 3 %
ERYTHROCYTE [DISTWIDTH] IN BLOOD: 14.6 % (ref 11–15)
FASTING DURATION TIME PATIENT: ABNORMAL H
GLOBULIN SER-MCNC: 4.1 G/DL (ref 2–4)
GLUCOSE BLD-MCNC: 132 MG/DL (ref 70–99)
GLUCOSE SERPL-MCNC: 142 MG/DL (ref 70–99)
GLUCOSE UR STRIP-MCNC: >1000 MG/DL
HCT VFR BLD CALC: 38.6 % (ref 36–46.5)
HCT VFR BLD CALC: 39 % (ref 36–46.5)
HGB BLD CALC-MCNC: 13.3 G/DL (ref 12–15.5)
HGB BLD-MCNC: 12.3 G/DL (ref 12–15.5)
HGB UR QL STRIP: NEGATIVE
HYALINE CASTS #/AREA URNS LPF: ABNORMAL /LPF
IMM GRANULOCYTES # BLD AUTO: 0 K/MCL (ref 0–0.2)
IMM GRANULOCYTES # BLD: 0 %
KETONES UR STRIP-MCNC: NEGATIVE MG/DL
LEUKOCYTE ESTERASE UR QL STRIP: ABNORMAL
LYMPHOCYTES # BLD: 1.8 K/MCL (ref 1–4)
LYMPHOCYTES NFR BLD: 30 %
MAGNESIUM SERPL-MCNC: 2.3 MG/DL (ref 1.7–2.4)
MCH RBC QN AUTO: 30.5 PG (ref 26–34)
MCHC RBC AUTO-ENTMCNC: 31.9 G/DL (ref 32–36.5)
MCV RBC AUTO: 95.8 FL (ref 78–100)
MONOCYTES # BLD: 0.6 K/MCL (ref 0.3–0.9)
MONOCYTES NFR BLD: 10 %
MUCOUS THREADS URNS QL MICRO: PRESENT
NEUTROPHILS # BLD: 3.4 K/MCL (ref 1.8–7.7)
NEUTROPHILS NFR BLD: 56 %
NITRITE UR QL STRIP: POSITIVE
NRBC BLD MANUAL-RTO: 0 /100 WBC
P AXIS (DEGREES): 55
PH UR STRIP: 5.5 [PH] (ref 5–7)
PLATELET # BLD AUTO: 198 K/MCL (ref 140–450)
POTASSIUM BLD-SCNC: 3.9 MMOL/L (ref 3.4–5.1)
POTASSIUM SERPL-SCNC: 3.8 MMOL/L (ref 3.4–5.1)
PR-INTERVAL (MSEC): 182
PROT SERPL-MCNC: 7.8 G/DL (ref 6.4–8.2)
PROT UR STRIP-MCNC: ABNORMAL MG/DL
QRS-INTERVAL (MSEC): 90
QT-INTERVAL (MSEC): 494
QTC: 468
R AXIS (DEGREES): 76
RBC # BLD: 4.03 MIL/MCL (ref 4–5.2)
RBC #/AREA URNS HPF: ABNORMAL /HPF
REPORT TEXT: NORMAL
SARS-COV-2 RNA RESP QL NAA+PROBE: NOT DETECTED
SERVICE CMNT-IMP: NORMAL
SERVICE CMNT-IMP: NORMAL
SODIUM BLD-SCNC: 140 MMOL/L (ref 135–145)
SODIUM SERPL-SCNC: 139 MMOL/L (ref 135–145)
SP GR UR STRIP: 1.02 (ref 1–1.03)
SQUAMOUS #/AREA URNS HPF: ABNORMAL /HPF
SYSTOLIC BLOOD PRESSURE: 179
T AXIS (DEGREES): 73
TROPONIN I BLD-MCNC: <0.1 NG/ML
UROBILINOGEN UR STRIP-MCNC: 0.2 MG/DL
VENTRICULAR RATE EKG/MIN (BPM): 54
WBC # BLD: 6 K/MCL (ref 4.2–11)
WBC #/AREA URNS HPF: >100 /HPF

## 2023-08-20 PROCEDURE — 10002525 HB LACERATION REPAIR-INTERMEDIATE

## 2023-08-20 PROCEDURE — 99285 EMERGENCY DEPT VISIT HI MDM: CPT

## 2023-08-20 PROCEDURE — 71045 X-RAY EXAM CHEST 1 VIEW: CPT | Performed by: RADIOLOGY

## 2023-08-20 PROCEDURE — 73590 X-RAY EXAM OF LOWER LEG: CPT

## 2023-08-20 PROCEDURE — 73562 X-RAY EXAM OF KNEE 3: CPT | Performed by: RADIOLOGY

## 2023-08-20 PROCEDURE — 70450 CT HEAD/BRAIN W/O DYE: CPT | Performed by: RADIOLOGY

## 2023-08-20 PROCEDURE — G1004 CDSM NDSC: HCPCS | Performed by: RADIOLOGY

## 2023-08-20 PROCEDURE — 87077 CULTURE AEROBIC IDENTIFY: CPT | Performed by: EMERGENCY MEDICINE

## 2023-08-20 PROCEDURE — 73562 X-RAY EXAM OF KNEE 3: CPT

## 2023-08-20 PROCEDURE — 81001 URINALYSIS AUTO W/SCOPE: CPT | Performed by: EMERGENCY MEDICINE

## 2023-08-20 PROCEDURE — 70450 CT HEAD/BRAIN W/O DYE: CPT

## 2023-08-20 PROCEDURE — 71045 X-RAY EXAM CHEST 1 VIEW: CPT

## 2023-08-20 PROCEDURE — 85025 COMPLETE CBC W/AUTO DIFF WBC: CPT | Performed by: EMERGENCY MEDICINE

## 2023-08-20 PROCEDURE — 36000 PLACE NEEDLE IN VEIN: CPT

## 2023-08-20 PROCEDURE — 80047 BASIC METABLC PNL IONIZED CA: CPT

## 2023-08-20 PROCEDURE — 87086 URINE CULTURE/COLONY COUNT: CPT | Performed by: EMERGENCY MEDICINE

## 2023-08-20 PROCEDURE — 84484 ASSAY OF TROPONIN QUANT: CPT

## 2023-08-20 PROCEDURE — 83735 ASSAY OF MAGNESIUM: CPT | Performed by: EMERGENCY MEDICINE

## 2023-08-20 PROCEDURE — 93005 ELECTROCARDIOGRAM TRACING: CPT | Performed by: EMERGENCY MEDICINE

## 2023-08-20 PROCEDURE — G1004 CDSM NDSC: HCPCS

## 2023-08-20 PROCEDURE — 80053 COMPREHEN METABOLIC PANEL: CPT | Performed by: EMERGENCY MEDICINE

## 2023-08-20 PROCEDURE — 87635 SARS-COV-2 COVID-19 AMP PRB: CPT | Performed by: EMERGENCY MEDICINE

## 2023-08-20 PROCEDURE — 36415 COLL VENOUS BLD VENIPUNCTURE: CPT

## 2023-08-20 PROCEDURE — 73590 X-RAY EXAM OF LOWER LEG: CPT | Performed by: RADIOLOGY

## 2023-08-20 RX ORDER — CEPHALEXIN 500 MG/1
500 CAPSULE ORAL 3 TIMES DAILY
Qty: 21 CAPSULE | Refills: 0 | Status: SHIPPED | OUTPATIENT
Start: 2023-08-20 | End: 2023-08-27

## 2023-08-20 RX ORDER — LIDOCAINE HYDROCHLORIDE AND EPINEPHRINE BITARTRATE 20; .01 MG/ML; MG/ML
INJECTION, SOLUTION SUBCUTANEOUS
Status: COMPLETED
Start: 2023-08-20 | End: 2023-08-20

## 2023-08-20 ASSESSMENT — ENCOUNTER SYMPTOMS
NERVOUS/ANXIOUS: 0
NAUSEA: 0
FEVER: 0
WOUND: 1
NUMBNESS: 0
NAUSEA: 0
BRUISES/BLEEDS EASILY: 0
FATIGUE: 1
CONFUSION: 0
ABDOMINAL PAIN: 0
SORE THROAT: 0
ABDOMINAL PAIN: 0
APPETITE CHANGE: 0
WEAKNESS: 1
HEADACHES: 0
LIGHT-HEADEDNESS: 0
COUGH: 0
VOMITING: 0
DIARRHEA: 0
SHORTNESS OF BREATH: 0
EYE DISCHARGE: 0
FEVER: 0
VOMITING: 0
ANAL BLEEDING: 0
NUMBNESS: 0
DIARRHEA: 0
CHILLS: 0
COLOR CHANGE: 0
ACTIVITY CHANGE: 0
WEAKNESS: 0
COUGH: 0
SHORTNESS OF BREATH: 0
CHILLS: 0
FATIGUE: 0
DIZZINESS: 0
CONFUSION: 0

## 2023-08-20 ASSESSMENT — PAIN SCALES - GENERAL
PAINLEVEL_OUTOF10: 0
PAINLEVEL_OUTOF10: 7
PAINLEVEL_OUTOF10: 0

## 2023-08-20 ASSESSMENT — PAIN DESCRIPTION - PAIN TYPE: TYPE: ACUTE PAIN

## 2023-08-23 LAB — BACTERIA UR CULT: ABNORMAL

## 2023-08-31 ENCOUNTER — OFFICE VISIT (OUTPATIENT)
Dept: PAIN MANAGEMENT | Age: 78
End: 2023-08-31
Attending: PHYSICIAN ASSISTANT

## 2023-08-31 VITALS — BODY MASS INDEX: 32.75 KG/M2 | WEIGHT: 166.8 LBS | HEIGHT: 60 IN | HEART RATE: 62 BPM | OXYGEN SATURATION: 94 %

## 2023-08-31 DIAGNOSIS — M47.817 LUMBOSACRAL SPONDYLOSIS WITHOUT MYELOPATHY: Primary | ICD-10-CM

## 2023-08-31 DIAGNOSIS — M25.562 CHRONIC PAIN OF LEFT KNEE: ICD-10-CM

## 2023-08-31 DIAGNOSIS — G89.29 CHRONIC PAIN OF LEFT KNEE: ICD-10-CM

## 2023-08-31 PROCEDURE — 99204 OFFICE O/P NEW MOD 45 MIN: CPT | Performed by: ANESTHESIOLOGY

## 2023-08-31 ASSESSMENT — PAIN SCALES - GENERAL: PAINLEVEL: 5

## 2023-09-25 DIAGNOSIS — E78.5 HYPERLIPIDEMIA: ICD-10-CM

## 2023-09-25 DIAGNOSIS — R60.9 EDEMA: Primary | ICD-10-CM

## 2023-09-25 DIAGNOSIS — I71.40 ABDOMINAL AORTIC ANEURYSM WITHOUT RUPTURE (CMD): ICD-10-CM

## 2023-09-25 DIAGNOSIS — I10 HTN (HYPERTENSION): ICD-10-CM

## 2023-10-06 ENCOUNTER — HOSPITAL ENCOUNTER (OUTPATIENT)
Dept: MRI IMAGING | Age: 78
Discharge: HOME | End: 2023-10-06
Attending: ANESTHESIOLOGY

## 2023-10-06 DIAGNOSIS — M47.817 LUMBOSACRAL SPONDYLOSIS WITHOUT MYELOPATHY: ICD-10-CM

## 2023-10-06 PROCEDURE — G1004 CDSM NDSC: HCPCS | Performed by: RADIOLOGY

## 2023-10-06 PROCEDURE — 72148 MRI LUMBAR SPINE W/O DYE: CPT

## 2023-10-06 PROCEDURE — G1004 CDSM NDSC: HCPCS

## 2023-10-06 PROCEDURE — 72148 MRI LUMBAR SPINE W/O DYE: CPT | Performed by: RADIOLOGY

## 2023-10-13 ENCOUNTER — TELEPHONE (OUTPATIENT)
Dept: PAIN MANAGEMENT | Age: 78
End: 2023-10-13

## 2023-10-26 ENCOUNTER — APPOINTMENT (OUTPATIENT)
Dept: PAIN MANAGEMENT | Age: 78
End: 2023-10-26

## 2023-11-02 ENCOUNTER — OFFICE VISIT (OUTPATIENT)
Dept: CARDIOLOGY | Age: 78
End: 2023-11-02
Attending: PHYSICIAN ASSISTANT

## 2023-11-02 VITALS
WEIGHT: 164 LBS | HEART RATE: 63 BPM | SYSTOLIC BLOOD PRESSURE: 120 MMHG | HEIGHT: 60 IN | DIASTOLIC BLOOD PRESSURE: 56 MMHG | BODY MASS INDEX: 32.2 KG/M2 | OXYGEN SATURATION: 93 %

## 2023-11-02 DIAGNOSIS — R60.0 LEG EDEMA: Primary | ICD-10-CM

## 2023-11-02 DIAGNOSIS — I15.8 OTHER SECONDARY HYPERTENSION: ICD-10-CM

## 2023-11-02 DIAGNOSIS — R09.89 OTHER SPECIFIED SYMPTOMS AND SIGNS INVOLVING THE CIRCULATORY AND RESPIRATORY SYSTEMS: ICD-10-CM

## 2023-11-02 DIAGNOSIS — I83.90 VARICOSE VEINS OF CALF: ICD-10-CM

## 2023-11-02 DIAGNOSIS — S81.802D WOUND OF LEFT LOWER EXTREMITY, SUBSEQUENT ENCOUNTER: ICD-10-CM

## 2023-11-02 PROCEDURE — 99203 OFFICE O/P NEW LOW 30 MIN: CPT | Performed by: INTERNAL MEDICINE

## 2023-11-02 RX ORDER — ATORVASTATIN CALCIUM 20 MG/1
1 TABLET, FILM COATED ORAL DAILY
COMMUNITY

## 2023-11-02 RX ORDER — EMPAGLIFLOZIN 10 MG/1
TABLET, FILM COATED ORAL
COMMUNITY
Start: 2023-10-20

## 2023-11-02 RX ORDER — LOSARTAN POTASSIUM 100 MG/1
50 TABLET ORAL DAILY
Qty: 15 TABLET | Refills: 0 | Status: SHIPPED | OUTPATIENT
Start: 2023-11-02 | End: 2023-12-02

## 2023-11-02 RX ORDER — POTASSIUM CHLORIDE 750 MG/1
10 TABLET, FILM COATED, EXTENDED RELEASE ORAL DAILY
COMMUNITY

## 2023-11-02 RX ORDER — ASPIRIN 81 MG/1
81 TABLET ORAL DAILY
COMMUNITY

## 2023-11-02 RX ORDER — FUROSEMIDE 20 MG/1
20 TABLET ORAL DAILY
COMMUNITY
Start: 2023-09-08 | End: 2023-11-02 | Stop reason: DRUGHIGH

## 2023-11-02 RX ORDER — ALENDRONATE SODIUM 70 MG/1
TABLET ORAL
COMMUNITY
Start: 2023-09-08

## 2023-11-02 RX ORDER — LOSARTAN POTASSIUM 100 MG/1
TABLET ORAL
COMMUNITY
Start: 2023-10-05 | End: 2023-11-02 | Stop reason: SDUPTHER

## 2023-11-02 RX ORDER — FUROSEMIDE 40 MG/1
40 TABLET ORAL DAILY
Qty: 30 TABLET | Refills: 11 | Status: SHIPPED | OUTPATIENT
Start: 2023-11-02 | End: 2023-12-02

## 2023-11-08 ENCOUNTER — OFFICE VISIT (OUTPATIENT)
Dept: PAIN MANAGEMENT | Age: 78
End: 2023-11-08

## 2023-11-08 VITALS — OXYGEN SATURATION: 92 % | BODY MASS INDEX: 32.3 KG/M2 | WEIGHT: 165.4 LBS | HEART RATE: 62 BPM

## 2023-11-08 DIAGNOSIS — M25.562 CHRONIC PAIN OF LEFT KNEE: ICD-10-CM

## 2023-11-08 DIAGNOSIS — M47.817 LUMBOSACRAL SPONDYLOSIS WITHOUT MYELOPATHY: Primary | ICD-10-CM

## 2023-11-08 DIAGNOSIS — G89.29 CHRONIC PAIN OF LEFT KNEE: ICD-10-CM

## 2023-11-08 PROCEDURE — 99213 OFFICE O/P EST LOW 20 MIN: CPT | Performed by: ANESTHESIOLOGY

## 2023-11-08 PROCEDURE — 20610 DRAIN/INJ JOINT/BURSA W/O US: CPT | Performed by: ANESTHESIOLOGY

## 2023-11-08 RX ORDER — LIDOCAINE HYDROCHLORIDE 10 MG/ML
5 INJECTION, SOLUTION EPIDURAL; INFILTRATION; INTRACAUDAL; PERINEURAL ONCE
Status: COMPLETED | OUTPATIENT
Start: 2023-11-08 | End: 2023-11-08

## 2023-11-08 RX ORDER — TRIAMCINOLONE ACETONIDE 40 MG/ML
40 INJECTION, SUSPENSION INTRA-ARTICULAR; INTRAMUSCULAR ONCE
Status: COMPLETED | OUTPATIENT
Start: 2023-11-08 | End: 2023-11-08

## 2023-11-08 RX ADMIN — TRIAMCINOLONE ACETONIDE 40 MG: 40 INJECTION, SUSPENSION INTRA-ARTICULAR; INTRAMUSCULAR at 14:38

## 2023-11-08 RX ADMIN — LIDOCAINE HYDROCHLORIDE 50 MG: 10 INJECTION, SOLUTION EPIDURAL; INFILTRATION; INTRACAUDAL; PERINEURAL at 14:37

## 2023-11-08 ASSESSMENT — PAIN SCALES - GENERAL: PAINLEVEL: 10

## 2023-11-13 ENCOUNTER — HOSPITAL ENCOUNTER (OUTPATIENT)
Dept: CARDIOLOGY | Age: 78
Discharge: HOME | End: 2023-11-13
Attending: INTERNAL MEDICINE

## 2023-11-13 ENCOUNTER — HOSPITAL ENCOUNTER (OUTPATIENT)
Dept: OTHER | Age: 78
Discharge: HOME | End: 2023-11-13
Attending: INTERNAL MEDICINE

## 2023-11-13 ENCOUNTER — APPOINTMENT (OUTPATIENT)
Dept: OTHER | Age: 78
End: 2023-11-13
Attending: INTERNAL MEDICINE

## 2023-11-13 DIAGNOSIS — R09.89 OTHER SPECIFIED SYMPTOMS AND SIGNS INVOLVING THE CIRCULATORY AND RESPIRATORY SYSTEMS: ICD-10-CM

## 2023-11-13 DIAGNOSIS — I83.90 VARICOSE VEINS OF CALF: ICD-10-CM

## 2023-11-13 DIAGNOSIS — R60.0 LEG EDEMA: ICD-10-CM

## 2023-11-13 DIAGNOSIS — S81.802D WOUND OF LEFT LOWER EXTREMITY, SUBSEQUENT ENCOUNTER: ICD-10-CM

## 2023-11-13 DIAGNOSIS — I15.8 OTHER SECONDARY HYPERTENSION: ICD-10-CM

## 2023-11-13 LAB
AORTIC VALVE AREA: 2.94 CM²
ASCENDING AORTA (AAD): 3.37 CM
AV MEAN GRADIENT (AVMG): 2.23 MMHG
AV PEAK GRADIENT (AVPG): 5.05 MMHG
AV PEAK VELOCITY (AVPV): 1.1 M/S
AVI LVOT PEAK GRADIENT (LVOTMG): 1.62 MMHG
DOP CALC LVOT PEAK VEL (LVOTPV): 0.8 M/S
E WAVE DECELARATION TIME (MDT): 0.36 S
EST RIGHT VENT SYSTOLIC PRESSURE BY TRICUSPID REGURGITATION JET (RVSP): 26.48 MMHG
INTERVENTRICULAR SEPTUM IN END DIASTOLE (IVSD): 0.72 CM
LEFT INTERNAL DIMENSION IN SYSTOLE (LVSD): 2.27 CM
LEFT VENTRICULAR INTERNAL DIMENSION IN DIASTOLE (LVDD): 3.39 CM
LEFT VENTRICULAR POSTERIOR WALL IN END DIASTOLE (LVPW): 0.79 CM
LV EF: 65 %
LV END SYSTOLIC LONGITUDINAL STRAIN GLOBAL (LVGS): -20.1 %
LVOT 2D (LVOTD): 2.21 CM
LVOT VTI (LVOTVTI): 20.5 CM
MV E TISSUE VEL LAT (MELV): 0 M/S
MV E TISSUE VEL MED (MESV): 0 M/S
MV E WAVE VEL/E TISSUE VEL MED(MSR): 15.74 UNITLESS
MV PEAK A VELOCITY (MVPAV): 0.8 M/S
MV PEAK E VELOCITY (MVPEV): 0.5 M/S
PV PEAK VELOCITY (PVPV): 0.6 M/S
SINUSES OF VALSALVA (SVD): 3.12 CM
TRICUSPID ANNULAR PLANE SYSTOLIC EXCURSION (TAPSE): 1.86 CM
TRICUSPID VALVE ANNULAR PEAK VELOCITY (TVAPV): 0 M/S
TRICUSPID VALVE PEAK REGURGITATION VELOCITY (TRPV): 2.4 M/S
TV ESTIMATED RIGHT ARTERIAL PRESSURE (RAP): 3 MMHG

## 2023-11-13 PROCEDURE — 93922 UPR/L XTREMITY ART 2 LEVELS: CPT | Performed by: RADIOLOGY

## 2023-11-13 PROCEDURE — 93356 MYOCRD STRAIN IMG SPCKL TRCK: CPT

## 2023-11-13 PROCEDURE — 93306 TTE W/DOPPLER COMPLETE: CPT | Performed by: INTERNAL MEDICINE

## 2023-11-13 PROCEDURE — 93922 UPR/L XTREMITY ART 2 LEVELS: CPT

## 2023-11-13 PROCEDURE — 93356 MYOCRD STRAIN IMG SPCKL TRCK: CPT | Performed by: INTERNAL MEDICINE

## 2023-11-14 ENCOUNTER — OFFICE VISIT (OUTPATIENT)
Dept: CHIROPRACTIC MEDICINE | Age: 78
End: 2023-11-14
Attending: ANESTHESIOLOGY

## 2023-11-14 VITALS — HEART RATE: 60 BPM | WEIGHT: 165 LBS | OXYGEN SATURATION: 96 % | BODY MASS INDEX: 32.39 KG/M2 | HEIGHT: 60 IN

## 2023-11-14 DIAGNOSIS — M99.02 SEGMENTAL AND SOMATIC DYSFUNCTION OF THORACIC REGION: ICD-10-CM

## 2023-11-14 DIAGNOSIS — M47.817 LUMBOSACRAL SPONDYLOSIS WITHOUT MYELOPATHY: Primary | ICD-10-CM

## 2023-11-14 DIAGNOSIS — M99.05 SEGMENTAL AND SOMATIC DYSFUNCTION OF PELVIC REGION: ICD-10-CM

## 2023-11-14 DIAGNOSIS — M99.03 SEGMENTAL AND SOMATIC DYSFUNCTION OF LUMBAR REGION: ICD-10-CM

## 2023-11-14 DIAGNOSIS — M99.01 SEGMENTAL AND SOMATIC DYSFUNCTION OF CERVICAL REGION: ICD-10-CM

## 2023-11-14 DIAGNOSIS — M47.812 CERVICAL SPONDYLOSIS WITHOUT MYELOPATHY: ICD-10-CM

## 2023-11-14 PROCEDURE — 98941 CHIROPRACT MANJ 3-4 REGIONS: CPT | Performed by: CHIROPRACTOR

## 2023-11-14 PROCEDURE — 99203 OFFICE O/P NEW LOW 30 MIN: CPT | Performed by: CHIROPRACTOR

## 2023-11-16 ENCOUNTER — HOSPITAL ENCOUNTER (OUTPATIENT)
Dept: REHABILITATION | Age: 78
Discharge: STILL ADMITTED OR ADMINISTRATIVE | End: 2023-11-16
Attending: ANESTHESIOLOGY

## 2023-11-16 PROCEDURE — 97110 THERAPEUTIC EXERCISES: CPT | Performed by: PHYSICAL THERAPIST

## 2023-11-16 PROCEDURE — 10004173 HB COUNTER-THERAPY VISIT PT: Performed by: PHYSICAL THERAPIST

## 2023-11-16 PROCEDURE — 97162 PT EVAL MOD COMPLEX 30 MIN: CPT | Performed by: PHYSICAL THERAPIST

## 2023-11-16 ASSESSMENT — ENCOUNTER SYMPTOMS
PAIN LOCATION: SEE ABOVE
SUBJECTIVE PAIN PROGRESSION: NO CHANGE
PAIN SEVERITY NOW: 8
PAIN SCALE AT LOWEST: 4
PAIN SCALE AT HIGHEST: 9
ALLEVIATING FACTORS: REST

## 2023-11-21 ENCOUNTER — APPOINTMENT (OUTPATIENT)
Dept: REHABILITATION | Age: 78
End: 2023-11-21
Attending: ANESTHESIOLOGY

## 2023-11-21 PROCEDURE — 97014 ELECTRIC STIMULATION THERAPY: CPT | Performed by: PHYSICAL THERAPIST

## 2023-11-21 PROCEDURE — 97535 SELF CARE MNGMENT TRAINING: CPT | Performed by: PHYSICAL THERAPIST

## 2023-11-21 PROCEDURE — 10004173 HB COUNTER-THERAPY VISIT PT: Performed by: PHYSICAL THERAPIST

## 2023-11-21 PROCEDURE — 97110 THERAPEUTIC EXERCISES: CPT | Performed by: PHYSICAL THERAPIST

## 2023-11-22 ENCOUNTER — APPOINTMENT (OUTPATIENT)
Dept: CHIROPRACTIC MEDICINE | Age: 78
End: 2023-11-22

## 2023-11-22 ENCOUNTER — TELEPHONE (OUTPATIENT)
Dept: CARDIOLOGY | Age: 78
End: 2023-11-22

## 2023-11-22 DIAGNOSIS — M47.812 CERVICAL SPONDYLOSIS WITHOUT MYELOPATHY: ICD-10-CM

## 2023-11-22 DIAGNOSIS — M99.05 SEGMENTAL AND SOMATIC DYSFUNCTION OF PELVIC REGION: ICD-10-CM

## 2023-11-22 DIAGNOSIS — M99.01 SEGMENTAL AND SOMATIC DYSFUNCTION OF CERVICAL REGION: ICD-10-CM

## 2023-11-22 DIAGNOSIS — M99.02 SEGMENTAL AND SOMATIC DYSFUNCTION OF THORACIC REGION: ICD-10-CM

## 2023-11-22 DIAGNOSIS — M47.817 LUMBOSACRAL SPONDYLOSIS WITHOUT MYELOPATHY: Primary | ICD-10-CM

## 2023-11-22 DIAGNOSIS — M99.03 SEGMENTAL AND SOMATIC DYSFUNCTION OF LUMBAR REGION: ICD-10-CM

## 2023-11-22 PROCEDURE — 98941 CHIROPRACT MANJ 3-4 REGIONS: CPT | Performed by: CHIROPRACTOR

## 2023-11-24 ENCOUNTER — APPOINTMENT (OUTPATIENT)
Dept: CHIROPRACTIC MEDICINE | Age: 78
End: 2023-11-24

## 2023-11-30 ENCOUNTER — APPOINTMENT (OUTPATIENT)
Dept: REHABILITATION | Age: 78
End: 2023-11-30
Attending: ANESTHESIOLOGY

## 2023-11-30 PROCEDURE — 97140 MANUAL THERAPY 1/> REGIONS: CPT | Performed by: PHYSICAL THERAPIST

## 2023-11-30 PROCEDURE — 10004173 HB COUNTER-THERAPY VISIT PT: Performed by: PHYSICAL THERAPIST

## 2023-11-30 PROCEDURE — 97535 SELF CARE MNGMENT TRAINING: CPT | Performed by: PHYSICAL THERAPIST

## 2023-11-30 PROCEDURE — 97112 NEUROMUSCULAR REEDUCATION: CPT | Performed by: PHYSICAL THERAPIST

## 2023-12-01 ENCOUNTER — OFFICE VISIT (OUTPATIENT)
Dept: CHIROPRACTIC MEDICINE | Age: 78
End: 2023-12-01

## 2023-12-01 DIAGNOSIS — M99.03 SEGMENTAL AND SOMATIC DYSFUNCTION OF LUMBAR REGION: ICD-10-CM

## 2023-12-01 DIAGNOSIS — M99.02 SEGMENTAL AND SOMATIC DYSFUNCTION OF THORACIC REGION: ICD-10-CM

## 2023-12-01 DIAGNOSIS — M99.01 SEGMENTAL AND SOMATIC DYSFUNCTION OF CERVICAL REGION: ICD-10-CM

## 2023-12-01 DIAGNOSIS — M47.817 LUMBOSACRAL SPONDYLOSIS WITHOUT MYELOPATHY: Primary | ICD-10-CM

## 2023-12-01 DIAGNOSIS — M47.812 CERVICAL SPONDYLOSIS WITHOUT MYELOPATHY: ICD-10-CM

## 2023-12-01 DIAGNOSIS — M99.05 SEGMENTAL AND SOMATIC DYSFUNCTION OF PELVIC REGION: ICD-10-CM

## 2023-12-01 PROCEDURE — 98941 CHIROPRACT MANJ 3-4 REGIONS: CPT | Performed by: CHIROPRACTOR

## 2023-12-05 ENCOUNTER — HOSPITAL ENCOUNTER (OUTPATIENT)
Dept: REHABILITATION | Age: 78
Discharge: STILL ADMITTED OR ADMINISTRATIVE | End: 2023-12-05
Attending: ANESTHESIOLOGY

## 2023-12-05 PROCEDURE — 97110 THERAPEUTIC EXERCISES: CPT | Performed by: PHYSICAL THERAPIST

## 2023-12-05 PROCEDURE — 10004173 HB COUNTER-THERAPY VISIT PT: Performed by: PHYSICAL THERAPIST

## 2023-12-05 PROCEDURE — 97535 SELF CARE MNGMENT TRAINING: CPT | Performed by: PHYSICAL THERAPIST

## 2024-02-22 ENCOUNTER — OFFICE VISIT (OUTPATIENT)
Dept: CARDIOLOGY | Age: 79
End: 2024-02-22
Attending: INTERNAL MEDICINE

## 2024-02-22 VITALS
DIASTOLIC BLOOD PRESSURE: 56 MMHG | SYSTOLIC BLOOD PRESSURE: 118 MMHG | RESPIRATION RATE: 16 BRPM | HEART RATE: 59 BPM | WEIGHT: 157 LBS | BODY MASS INDEX: 30.66 KG/M2

## 2024-02-22 DIAGNOSIS — R60.0 LEG EDEMA: ICD-10-CM

## 2024-02-22 DIAGNOSIS — I83.90 VARICOSE VEINS OF CALF: ICD-10-CM

## 2024-02-22 DIAGNOSIS — I73.9 PAD (PERIPHERAL ARTERY DISEASE) (CMD): Primary | ICD-10-CM

## 2024-02-22 DIAGNOSIS — S81.802D WOUND OF LEFT LOWER EXTREMITY, SUBSEQUENT ENCOUNTER: ICD-10-CM

## 2024-02-22 DIAGNOSIS — I15.8 OTHER SECONDARY HYPERTENSION: ICD-10-CM

## 2024-02-22 PROCEDURE — 99211 OFF/OP EST MAY X REQ PHY/QHP: CPT

## 2024-02-22 RX ORDER — POTASSIUM CHLORIDE 750 MG/1
TABLET, EXTENDED RELEASE ORAL
COMMUNITY
Start: 2024-02-09

## 2024-02-22 RX ORDER — CHOLECALCIFEROL (VITAMIN D3) 25 MCG
CAPSULE ORAL DAILY
COMMUNITY

## 2024-02-22 RX ORDER — ASPIRIN 81 MG/1
81 TABLET ORAL DAILY
Qty: 90 TABLET | Refills: 3 | Status: SHIPPED | OUTPATIENT
Start: 2024-02-22 | End: 2025-02-21

## 2024-02-22 RX ORDER — LOSARTAN POTASSIUM 100 MG/1
50 TABLET ORAL DAILY
Qty: 45 TABLET | Refills: 3 | Status: SHIPPED | OUTPATIENT
Start: 2024-02-22 | End: 2025-02-21

## 2024-02-22 RX ORDER — FUROSEMIDE 40 MG/1
40 TABLET ORAL DAILY
Qty: 90 TABLET | Refills: 3 | Status: SHIPPED | OUTPATIENT
Start: 2024-02-22 | End: 2025-02-16

## 2024-02-23 ENCOUNTER — APPOINTMENT (OUTPATIENT)
Dept: CT IMAGING | Age: 79
End: 2024-02-23
Attending: RADIOLOGY

## 2024-02-29 ENCOUNTER — IMAGING SERVICES (OUTPATIENT)
Dept: CT IMAGING | Age: 79
End: 2024-02-29
Attending: RADIOLOGY

## 2024-02-29 DIAGNOSIS — Z95.828 STATUS POST AORTIC COARCTATION STENT PLACEMENT: ICD-10-CM

## 2024-02-29 DIAGNOSIS — Z87.74 STATUS POST AORTIC COARCTATION STENT PLACEMENT: ICD-10-CM

## 2024-02-29 PROCEDURE — 71275 CT ANGIOGRAPHY CHEST: CPT | Performed by: INTERNAL MEDICINE

## 2024-02-29 PROCEDURE — G1004 CDSM NDSC: HCPCS | Performed by: INTERNAL MEDICINE

## 2024-03-01 ENCOUNTER — APPOINTMENT (OUTPATIENT)
Dept: VASCULAR SURGERY | Age: 79
End: 2024-03-01

## 2024-03-15 ENCOUNTER — TELEPHONE (OUTPATIENT)
Dept: VASCULAR SURGERY | Age: 79
End: 2024-03-15

## 2024-04-24 ENCOUNTER — EXTERNAL RECORD (OUTPATIENT)
Dept: OTHER | Age: 79
End: 2024-04-24

## 2024-07-11 ENCOUNTER — APPOINTMENT (RX ONLY)
Dept: URBAN - METROPOLITAN AREA CLINIC 166 | Facility: CLINIC | Age: 79
Setting detail: DERMATOLOGY
End: 2024-07-11

## 2024-07-11 DIAGNOSIS — D22 MELANOCYTIC NEVI: ICD-10-CM

## 2024-07-11 DIAGNOSIS — L57.8 OTHER SKIN CHANGES DUE TO CHRONIC EXPOSURE TO NONIONIZING RADIATION: ICD-10-CM

## 2024-07-11 DIAGNOSIS — L82.1 OTHER SEBORRHEIC KERATOSIS: ICD-10-CM

## 2024-07-11 DIAGNOSIS — Z12.83 ENCOUNTER FOR SCREENING FOR MALIGNANT NEOPLASM OF SKIN: ICD-10-CM

## 2024-07-11 DIAGNOSIS — L81.4 OTHER MELANIN HYPERPIGMENTATION: ICD-10-CM

## 2024-07-11 DIAGNOSIS — L57.0 ACTINIC KERATOSIS: ICD-10-CM

## 2024-07-11 PROBLEM — D22.5 MELANOCYTIC NEVI OF TRUNK: Status: ACTIVE | Noted: 2024-07-11

## 2024-07-11 PROBLEM — D22.4 MELANOCYTIC NEVI OF SCALP AND NECK: Status: ACTIVE | Noted: 2024-07-11

## 2024-07-11 PROBLEM — D22.39 MELANOCYTIC NEVI OF OTHER PARTS OF FACE: Status: ACTIVE | Noted: 2024-07-11

## 2024-07-11 PROBLEM — D22.61 MELANOCYTIC NEVI OF RIGHT UPPER LIMB, INCLUDING SHOULDER: Status: ACTIVE | Noted: 2024-07-11

## 2024-07-11 PROBLEM — D22.62 MELANOCYTIC NEVI OF LEFT UPPER LIMB, INCLUDING SHOULDER: Status: ACTIVE | Noted: 2024-07-11

## 2024-07-11 PROCEDURE — 17003 DESTRUCT PREMALG LES 2-14: CPT

## 2024-07-11 PROCEDURE — 17000 DESTRUCT PREMALG LESION: CPT

## 2024-07-11 PROCEDURE — ? LIQUID NITROGEN

## 2024-07-11 PROCEDURE — ? FULL BODY SKIN EXAM - DECLINED

## 2024-07-11 PROCEDURE — ? TREATMENT REGIMEN

## 2024-07-11 PROCEDURE — ? COUNSELING

## 2024-07-11 PROCEDURE — 99203 OFFICE O/P NEW LOW 30 MIN: CPT | Mod: 25

## 2024-07-11 ASSESSMENT — LOCATION SIMPLE DESCRIPTION DERM
LOCATION SIMPLE: LEFT UPPER BACK
LOCATION SIMPLE: SUPERIOR FOREHEAD
LOCATION SIMPLE: LEFT ANTERIOR NECK
LOCATION SIMPLE: UPPER BACK
LOCATION SIMPLE: LEFT POSTERIOR UPPER ARM
LOCATION SIMPLE: SCALP
LOCATION SIMPLE: CHEST
LOCATION SIMPLE: LEFT BREAST
LOCATION SIMPLE: INFERIOR FOREHEAD
LOCATION SIMPLE: LEFT UPPER ARM
LOCATION SIMPLE: RIGHT POSTERIOR UPPER ARM
LOCATION SIMPLE: POSTERIOR NECK
LOCATION SIMPLE: RIGHT SCALP

## 2024-07-11 ASSESSMENT — LOCATION DETAILED DESCRIPTION DERM
LOCATION DETAILED: RIGHT PROXIMAL POSTERIOR UPPER ARM
LOCATION DETAILED: RIGHT SUPERIOR PARIETAL SCALP
LOCATION DETAILED: LEFT SUPERIOR MEDIAL UPPER BACK
LOCATION DETAILED: MIDDLE STERNUM
LOCATION DETAILED: MID POSTERIOR NECK
LOCATION DETAILED: LEFT MEDIAL BREAST 10-11:00 REGION
LOCATION DETAILED: LEFT PROXIMAL POSTERIOR UPPER ARM
LOCATION DETAILED: LEFT INFERIOR ANTERIOR NECK
LOCATION DETAILED: RIGHT DISTAL POSTERIOR UPPER ARM
LOCATION DETAILED: LEFT DISTAL POSTERIOR UPPER ARM
LOCATION DETAILED: LEFT ANTERIOR PROXIMAL UPPER ARM
LOCATION DETAILED: RIGHT MEDIAL FRONTAL SCALP
LOCATION DETAILED: SUPERIOR THORACIC SPINE
LOCATION DETAILED: UPPER STERNUM
LOCATION DETAILED: LEFT INFERIOR POSTERIOR NECK
LOCATION DETAILED: SUPERIOR MID FOREHEAD
LOCATION DETAILED: INFERIOR MID FOREHEAD

## 2024-07-11 ASSESSMENT — LOCATION ZONE DERM
LOCATION ZONE: NECK
LOCATION ZONE: TRUNK
LOCATION ZONE: FACE
LOCATION ZONE: ARM
LOCATION ZONE: SCALP

## 2024-07-11 ASSESSMENT — PAIN INTENSITY VAS: HOW INTENSE IS YOUR PAIN 0 BEING NO PAIN, 10 BEING THE MOST SEVERE PAIN POSSIBLE?: NO PAIN

## 2024-07-11 NOTE — PROCEDURE: LIQUID NITROGEN
Show Applicator Variable?: Yes
Number Of Freeze-Thaw Cycles: 3 freeze-thaw cycles
Render Note In Bullet Format When Appropriate: No
Duration Of Freeze Thaw-Cycle (Seconds): 3
Detail Level: Detailed
Consent: The patient's consent was obtained including but not limited to risks of crusting, scabbing, blistering, scarring, darker or lighter pigmentary change, recurrence, incomplete removal and infection.
Post-Care Instructions: I reviewed with the patient in detail post-care instructions. Patient is to wear sunprotection, and avoid picking at any of the treated lesions. Pt may apply Vaseline to crusted or scabbing areas.

## 2024-07-11 NOTE — PROCEDURE: FULL BODY SKIN EXAM - DECLINED
Price (Do Not Change): 0.00
Detail Level: Generalized
Instructions: This plan will send the code FBSD to the PM system.  DO NOT or CHANGE the price.
Detail Level: Simple

## 2024-07-31 DIAGNOSIS — H91.90 HEARING LOSS, UNSPECIFIED HEARING LOSS TYPE, UNSPECIFIED LATERALITY: Primary | ICD-10-CM

## 2024-08-01 ENCOUNTER — TELEPHONE (OUTPATIENT)
Dept: ADMINISTRATIVE | Age: 79
End: 2024-08-01

## 2024-08-22 ENCOUNTER — OFFICE VISIT (OUTPATIENT)
Dept: CARDIOLOGY | Age: 79
End: 2024-08-22
Attending: INTERNAL MEDICINE

## 2024-08-22 VITALS
HEIGHT: 60 IN | BODY MASS INDEX: 30.63 KG/M2 | SYSTOLIC BLOOD PRESSURE: 121 MMHG | DIASTOLIC BLOOD PRESSURE: 72 MMHG | WEIGHT: 156 LBS | HEART RATE: 54 BPM

## 2024-08-22 DIAGNOSIS — I71.03 DISSECTION OF THORACOABDOMINAL AORTA  (CMD): ICD-10-CM

## 2024-08-22 DIAGNOSIS — I10 PRIMARY HYPERTENSION: ICD-10-CM

## 2024-08-22 DIAGNOSIS — I73.9 PAD (PERIPHERAL ARTERY DISEASE) (CMD): Primary | ICD-10-CM

## 2024-08-22 DIAGNOSIS — E78.2 MIXED HYPERLIPIDEMIA: ICD-10-CM

## 2024-08-22 RX ORDER — LIDOCAINE/PRILOCAINE 2.5 %-2.5%
CREAM (GRAM) TOPICAL
COMMUNITY
Start: 2024-07-17

## 2024-08-22 RX ORDER — LANCETS 33 GAUGE
EACH MISCELLANEOUS
COMMUNITY
Start: 2024-04-24

## 2024-08-22 RX ORDER — BLOOD SUGAR DIAGNOSTIC
STRIP MISCELLANEOUS
COMMUNITY

## 2024-09-09 DIAGNOSIS — H91.90 HEARING DISORDER, UNSPECIFIED LATERALITY: Primary | ICD-10-CM

## 2024-09-19 ENCOUNTER — APPOINTMENT (OUTPATIENT)
Dept: OTOLARYNGOLOGY | Age: 79
End: 2024-09-19

## 2024-09-19 ENCOUNTER — APPOINTMENT (OUTPATIENT)
Dept: AUDIOLOGY | Age: 79
End: 2024-09-19

## 2024-09-19 VITALS — SYSTOLIC BLOOD PRESSURE: 124 MMHG | OXYGEN SATURATION: 95 % | HEART RATE: 67 BPM | DIASTOLIC BLOOD PRESSURE: 66 MMHG

## 2024-09-19 DIAGNOSIS — H90.3 BILATERAL SENSORINEURAL HEARING LOSS: Primary | ICD-10-CM

## 2024-09-19 DIAGNOSIS — H90.3 SENSORINEURAL HEARING LOSS (SNHL) OF BOTH EARS: Primary | ICD-10-CM

## 2024-09-19 PROCEDURE — 92567 TYMPANOMETRY: CPT | Performed by: AUDIOLOGIST

## 2024-09-19 PROCEDURE — 92557 COMPREHENSIVE HEARING TEST: CPT | Performed by: AUDIOLOGIST

## 2025-01-09 ENCOUNTER — APPOINTMENT (OUTPATIENT)
Dept: AUDIOLOGY | Age: 80
End: 2025-01-09

## 2025-01-09 DIAGNOSIS — H90.3 BILATERAL SENSORINEURAL HEARING LOSS: Primary | ICD-10-CM

## 2025-01-09 PROCEDURE — 92591 HEARING AID EXAM, BOTH EARS: CPT | Performed by: AUDIOLOGIST

## 2025-01-28 ENCOUNTER — LAB SERVICES (OUTPATIENT)
Dept: LAB | Age: 80
End: 2025-01-28

## 2025-01-28 ENCOUNTER — OFFICE VISIT (OUTPATIENT)
Dept: RHEUMATOLOGY | Age: 80
End: 2025-01-28

## 2025-01-28 ENCOUNTER — IMAGING SERVICES (OUTPATIENT)
Dept: GENERAL RADIOLOGY | Age: 80
End: 2025-01-28

## 2025-01-28 VITALS
HEIGHT: 60 IN | BODY MASS INDEX: 31.8 KG/M2 | HEART RATE: 61 BPM | WEIGHT: 162 LBS | SYSTOLIC BLOOD PRESSURE: 117 MMHG | DIASTOLIC BLOOD PRESSURE: 67 MMHG | TEMPERATURE: 97.7 F

## 2025-01-28 DIAGNOSIS — M25.562 CHRONIC PAIN OF BOTH KNEES: ICD-10-CM

## 2025-01-28 DIAGNOSIS — G89.29 CHRONIC PAIN OF BOTH KNEES: ICD-10-CM

## 2025-01-28 DIAGNOSIS — G89.29 CHRONIC PAIN OF BOTH KNEES: Primary | ICD-10-CM

## 2025-01-28 DIAGNOSIS — M25.562 CHRONIC PAIN OF BOTH KNEES: Primary | ICD-10-CM

## 2025-01-28 DIAGNOSIS — M25.561 CHRONIC PAIN OF BOTH KNEES: Primary | ICD-10-CM

## 2025-01-28 DIAGNOSIS — M25.561 CHRONIC PAIN OF BOTH KNEES: ICD-10-CM

## 2025-01-28 LAB — ERYTHROCYTE [SEDIMENTATION RATE] IN BLOOD BY WESTERGREN METHOD: 17 MM/HR (ref 0–20)

## 2025-01-28 PROCEDURE — 86140 C-REACTIVE PROTEIN: CPT

## 2025-01-28 PROCEDURE — 85652 RBC SED RATE AUTOMATED: CPT

## 2025-01-28 PROCEDURE — 36415 COLL VENOUS BLD VENIPUNCTURE: CPT

## 2025-01-28 PROCEDURE — 73564 X-RAY EXAM KNEE 4 OR MORE: CPT

## 2025-01-28 PROCEDURE — 99204 OFFICE O/P NEW MOD 45 MIN: CPT

## 2025-01-28 ASSESSMENT — PAIN SCALES - GENERAL: PAINLEVEL: 5

## 2025-01-29 ENCOUNTER — TELEPHONE (OUTPATIENT)
Dept: RHEUMATOLOGY | Age: 80
End: 2025-01-29

## 2025-01-29 LAB — CRP SERPL-MCNC: <5 MG/L

## 2025-02-10 ENCOUNTER — APPOINTMENT (OUTPATIENT)
Dept: AUDIOLOGY | Age: 80
End: 2025-02-10

## 2025-02-10 DIAGNOSIS — H90.3 BILATERAL SENSORINEURAL HEARING LOSS: Primary | ICD-10-CM

## 2025-02-17 ENCOUNTER — APPOINTMENT (OUTPATIENT)
Dept: AUDIOLOGY | Age: 80
End: 2025-02-17

## 2025-02-24 ENCOUNTER — APPOINTMENT (OUTPATIENT)
Dept: AUDIOLOGY | Age: 80
End: 2025-02-24

## 2025-02-24 DIAGNOSIS — H90.3 BILATERAL SENSORINEURAL HEARING LOSS: Primary | ICD-10-CM

## 2025-02-24 PROCEDURE — 92593 HEARING AID CHECK - APPT WITH AUDIOLOGIST (BINAURAL): CPT | Performed by: AUDIOLOGIST

## 2025-02-27 ENCOUNTER — OFFICE VISIT (OUTPATIENT)
Dept: CARDIOLOGY | Age: 80
End: 2025-02-27
Attending: INTERNAL MEDICINE

## 2025-02-27 VITALS
HEIGHT: 60 IN | WEIGHT: 165 LBS | HEART RATE: 60 BPM | SYSTOLIC BLOOD PRESSURE: 165 MMHG | BODY MASS INDEX: 32.39 KG/M2 | DIASTOLIC BLOOD PRESSURE: 71 MMHG

## 2025-02-27 DIAGNOSIS — I15.8 OTHER SECONDARY HYPERTENSION: Primary | ICD-10-CM

## 2025-02-27 DIAGNOSIS — R60.0 LEG EDEMA: ICD-10-CM

## 2025-02-27 DIAGNOSIS — S81.802D WOUND OF LEFT LOWER EXTREMITY, SUBSEQUENT ENCOUNTER: ICD-10-CM

## 2025-02-27 DIAGNOSIS — I83.90 VARICOSE VEINS OF CALF: ICD-10-CM

## 2025-02-27 RX ORDER — LOSARTAN POTASSIUM 100 MG/1
100 TABLET ORAL DAILY
Qty: 90 TABLET | Refills: 3 | Status: SHIPPED | OUTPATIENT
Start: 2025-02-27 | End: 2026-02-27

## 2025-02-27 RX ORDER — ATORVASTATIN CALCIUM 20 MG/1
20 TABLET, FILM COATED ORAL DAILY
Qty: 90 TABLET | Refills: 3 | Status: SHIPPED | OUTPATIENT
Start: 2025-02-27 | End: 2026-02-27

## 2025-03-10 ENCOUNTER — APPOINTMENT (OUTPATIENT)
Dept: AUDIOLOGY | Age: 80
End: 2025-03-10

## 2025-03-12 ENCOUNTER — APPOINTMENT (OUTPATIENT)
Dept: AUDIOLOGY | Age: 80
End: 2025-03-12

## 2025-03-26 ENCOUNTER — APPOINTMENT (OUTPATIENT)
Dept: AUDIOLOGY | Age: 80
End: 2025-03-26

## 2025-03-26 DIAGNOSIS — H90.3 BILATERAL SENSORINEURAL HEARING LOSS: Primary | ICD-10-CM

## 2025-03-27 DIAGNOSIS — I83.90 VARICOSE VEINS OF CALF: ICD-10-CM

## 2025-03-27 DIAGNOSIS — S81.802D WOUND OF LEFT LOWER EXTREMITY, SUBSEQUENT ENCOUNTER: ICD-10-CM

## 2025-03-27 DIAGNOSIS — R60.0 LEG EDEMA: ICD-10-CM

## 2025-03-28 RX ORDER — FUROSEMIDE 40 MG/1
40 TABLET ORAL DAILY
Qty: 90 TABLET | Refills: 0 | Status: SHIPPED | OUTPATIENT
Start: 2025-03-28 | End: 2026-03-23

## 2025-04-16 ENCOUNTER — APPOINTMENT (OUTPATIENT)
Dept: AUDIOLOGY | Age: 80
End: 2025-04-16

## 2025-04-17 DIAGNOSIS — I71.40 ABDOMINAL AORTIC ANEURYSM, WITHOUT RUPTURE, UNSPECIFIED (CMD): ICD-10-CM

## 2025-04-17 DIAGNOSIS — Z91.89 OTHER SPECIFIED PERSONAL RISK FACTORS, NOT ELSEWHERE CLASSIFIED: ICD-10-CM

## 2025-04-17 DIAGNOSIS — I83.93 ASYMPTOMATIC VARICOSE VEINS OF BILATERAL LOWER EXTREMITIES: ICD-10-CM

## 2025-04-17 DIAGNOSIS — E78.5 HYPERLIPIDEMIA, UNSPECIFIED: Primary | ICD-10-CM

## 2025-08-28 ENCOUNTER — OFFICE VISIT (OUTPATIENT)
Dept: CARDIOLOGY | Age: 80
End: 2025-08-28
Attending: INTERNAL MEDICINE